# Patient Record
Sex: FEMALE | Race: WHITE | Employment: OTHER | URBAN - METROPOLITAN AREA
[De-identification: names, ages, dates, MRNs, and addresses within clinical notes are randomized per-mention and may not be internally consistent; named-entity substitution may affect disease eponyms.]

---

## 2017-04-29 DIAGNOSIS — F51.02 TRANSIENT INSOMNIA: ICD-10-CM

## 2017-04-30 RX ORDER — ZOLPIDEM TARTRATE 5 MG/1
TABLET ORAL
Qty: 25 TAB | Refills: 2 | OUTPATIENT
Start: 2017-04-30 | End: 2017-10-04 | Stop reason: SDUPTHER

## 2017-05-11 ENCOUNTER — TELEPHONE (OUTPATIENT)
Dept: INTERNAL MEDICINE CLINIC | Age: 77
End: 2017-05-11

## 2017-05-15 ENCOUNTER — TELEPHONE (OUTPATIENT)
Dept: INTERNAL MEDICINE CLINIC | Age: 77
End: 2017-05-15

## 2017-05-15 NOTE — TELEPHONE ENCOUNTER
Patient advised she received a letter stating she was overdue for her pneumonia vaccine but advises she received the prevnar in 2015 and per her record she received the pneumovax in 2010. I advised we will send ALESSIO a records request to get her eye exam report. I also advised she is up to date on her shingles & tdap as well. I advised at her annual MW all of this can be addressed. Patient advised she will continue to call periodically to see if we have any openings sooner than October for her annual visit.

## 2017-05-15 NOTE — TELEPHONE ENCOUNTER
Pt requesting a return call from the nurse to discuss inoculations that are due before her next scheduled visit.  Please call [phone]

## 2017-05-23 DIAGNOSIS — E89.41 HOT FLASHES DUE TO SURGICAL MENOPAUSE: ICD-10-CM

## 2017-05-24 RX ORDER — NORETHINDRONE ACETATE AND ETHINYL ESTRADIOL 1; 5 MG/1; UG/1
TABLET ORAL
Qty: 84 TAB | Refills: 1 | Status: SHIPPED | OUTPATIENT
Start: 2017-05-24 | End: 2018-05-30 | Stop reason: SDUPTHER

## 2017-09-11 DIAGNOSIS — E03.9 UNSPECIFIED HYPOTHYROIDISM: ICD-10-CM

## 2017-09-11 RX ORDER — LEVOTHYROXINE SODIUM 75 UG/1
TABLET ORAL
Qty: 90 TAB | Refills: 3 | Status: SHIPPED | OUTPATIENT
Start: 2017-09-11 | End: 2018-10-15 | Stop reason: SDUPTHER

## 2017-09-18 ENCOUNTER — TELEPHONE (OUTPATIENT)
Dept: INTERNAL MEDICINE CLINIC | Age: 77
End: 2017-09-18

## 2017-09-18 DIAGNOSIS — E03.9 UNSPECIFIED HYPOTHYROIDISM: Primary | ICD-10-CM

## 2017-09-18 DIAGNOSIS — E55.9 VITAMIN D DEFICIENCY: ICD-10-CM

## 2017-09-18 DIAGNOSIS — Z00.00 ROUTINE GENERAL MEDICAL EXAMINATION AT A HEALTH CARE FACILITY: ICD-10-CM

## 2017-09-18 NOTE — TELEPHONE ENCOUNTER
Patient has appointment for her Annual Medicare Wellness on 10/04/2017 and she wants to come in before then to get blood work done.  Call her at [phone]

## 2017-09-18 NOTE — TELEPHONE ENCOUNTER
Notified patient her labs have been printed. She asked I fax them over to the lab [facility] on [facility] & she will get them done fasting a week prior to her physical appt. Lab slip has been faxed.

## 2017-09-27 ENCOUNTER — HOSPITAL ENCOUNTER (OUTPATIENT)
Dept: LAB | Age: 77
Discharge: HOME | End: 2017-09-27
Payer: MEDICARE

## 2017-09-27 PROCEDURE — 82306 VITAMIN D 25 HYDROXY: CPT

## 2017-09-27 PROCEDURE — 81001 URINALYSIS AUTO W/SCOPE: CPT

## 2017-09-27 PROCEDURE — 80061 LIPID PANEL: CPT

## 2017-09-27 PROCEDURE — 84443 ASSAY THYROID STIM HORMONE: CPT

## 2017-09-27 PROCEDURE — 84439 ASSAY OF FREE THYROXINE: CPT

## 2017-09-27 PROCEDURE — 85025 COMPLETE CBC W/AUTO DIFF WBC: CPT

## 2017-09-27 PROCEDURE — 36415 COLL VENOUS BLD VENIPUNCTURE: CPT

## 2017-09-27 PROCEDURE — 80053 COMPREHEN METABOLIC PANEL: CPT

## 2017-09-28 LAB
25(OH)D3+25(OH)D2 SERPL-MCNC: 43.8 NG/ML (ref 30–100)
ALBUMIN SERPL-MCNC: 3.9 G/DL (ref 3.5–4.8)
ALBUMIN/GLOB SERPL: 1.6 {RATIO} (ref 1.2–2.2)
ALP SERPL-CCNC: 50 IU/L (ref 39–117)
ALT SERPL-CCNC: 15 IU/L (ref 0–32)
APPEARANCE UR: CLEAR
AST SERPL-CCNC: 23 IU/L (ref 0–40)
BACTERIA #/AREA URNS HPF: ABNORMAL /[HPF]
BASOPHILS # BLD AUTO: 0.1 X10E3/UL (ref 0–0.2)
BASOPHILS NFR BLD AUTO: 1 %
BILIRUB SERPL-MCNC: 0.5 MG/DL (ref 0–1.2)
BILIRUB UR QL STRIP: NEGATIVE
BUN SERPL-MCNC: 17 MG/DL (ref 8–27)
BUN/CREAT SERPL: 20 (ref 12–28)
CALCIUM SERPL-MCNC: 8.6 MG/DL (ref 8.7–10.3)
CASTS URNS QL MICRO: ABNORMAL /LPF
CHLORIDE SERPL-SCNC: 105 MMOL/L (ref 96–106)
CHOLEST SERPL-MCNC: 183 MG/DL (ref 100–199)
CO2 SERPL-SCNC: 24 MMOL/L (ref 18–29)
COLOR UR: YELLOW
CREAT SERPL-MCNC: 0.84 MG/DL (ref 0.57–1)
EOSINOPHIL # BLD AUTO: 0.2 X10E3/UL (ref 0–0.4)
EOSINOPHIL NFR BLD AUTO: 3 %
EPI CELLS #/AREA URNS HPF: ABNORMAL /HPF
ERYTHROCYTE [DISTWIDTH] IN BLOOD BY AUTOMATED COUNT: 14 % (ref 12.3–15.4)
GLOBULIN SER CALC-MCNC: 2.5 G/DL (ref 1.5–4.5)
GLUCOSE SERPL-MCNC: 80 MG/DL (ref 65–99)
GLUCOSE UR QL: NEGATIVE
HCT VFR BLD AUTO: 38.9 % (ref 34–46.6)
HDLC SERPL-MCNC: 62 MG/DL
HGB BLD-MCNC: 13.3 G/DL (ref 11.1–15.9)
HGB UR QL STRIP: NEGATIVE
IMM GRANULOCYTES # BLD: 0 X10E3/UL (ref 0–0.1)
IMM GRANULOCYTES NFR BLD: 0 %
INTERPRETATION, 910389: NORMAL
KETONES UR QL STRIP: NEGATIVE
LDLC SERPL CALC-MCNC: 109 MG/DL (ref 0–99)
LEUKOCYTE ESTERASE UR QL STRIP: ABNORMAL
LYMPHOCYTES # BLD AUTO: 2.3 X10E3/UL (ref 0.7–3.1)
LYMPHOCYTES NFR BLD AUTO: 34 %
MCH RBC QN AUTO: 30.4 PG (ref 26.6–33)
MCHC RBC AUTO-ENTMCNC: 34.2 G/DL (ref 31.5–35.7)
MCV RBC AUTO: 89 FL (ref 79–97)
MICRO URNS: ABNORMAL
MONOCYTES # BLD AUTO: 0.4 X10E3/UL (ref 0.1–0.9)
MONOCYTES NFR BLD AUTO: 7 %
NEUTROPHILS # BLD AUTO: 3.8 X10E3/UL (ref 1.4–7)
NEUTROPHILS NFR BLD AUTO: 55 %
NITRITE UR QL STRIP: NEGATIVE
PH UR STRIP: 7 [PH] (ref 5–7.5)
PLATELET # BLD AUTO: 268 X10E3/UL (ref 150–379)
POTASSIUM SERPL-SCNC: 4.3 MMOL/L (ref 3.5–5.2)
PROT SERPL-MCNC: 6.4 G/DL (ref 6–8.5)
PROT UR QL STRIP: NEGATIVE
RBC # BLD AUTO: 4.37 X10E6/UL (ref 3.77–5.28)
RBC #/AREA URNS HPF: ABNORMAL /HPF
SODIUM SERPL-SCNC: 142 MMOL/L (ref 134–144)
SP GR UR: 1.01 (ref 1–1.03)
T4 FREE SERPL-MCNC: 1.49 NG/DL (ref 0.82–1.77)
TRIGL SERPL-MCNC: 60 MG/DL (ref 0–149)
TSH SERPL DL<=0.005 MIU/L-ACNC: 0.94 UIU/ML (ref 0.45–4.5)
UROBILINOGEN UR STRIP-MCNC: 0.2 MG/DL (ref 0.2–1)
VLDLC SERPL CALC-MCNC: 12 MG/DL (ref 5–40)
WBC # BLD AUTO: 6.8 X10E3/UL (ref 3.4–10.8)
WBC #/AREA URNS HPF: ABNORMAL /HPF

## 2017-10-04 ENCOUNTER — HOSPITAL ENCOUNTER (OUTPATIENT)
Dept: LAB | Age: 77
Discharge: HOME | End: 2017-10-04
Payer: MEDICARE

## 2017-10-04 ENCOUNTER — OFFICE VISIT (OUTPATIENT)
Dept: INTERNAL MEDICINE CLINIC | Age: 77
End: 2017-10-04

## 2017-10-04 VITALS
SYSTOLIC BLOOD PRESSURE: 111 MMHG | WEIGHT: 106.6 LBS | TEMPERATURE: 97.8 F | BODY MASS INDEX: 20.12 KG/M2 | DIASTOLIC BLOOD PRESSURE: 60 MMHG | RESPIRATION RATE: 16 BRPM | HEIGHT: 61 IN | OXYGEN SATURATION: 96 % | HEART RATE: 56 BPM

## 2017-10-04 DIAGNOSIS — Z00.00 MEDICARE ANNUAL WELLNESS VISIT, INITIAL: Primary | ICD-10-CM

## 2017-10-04 DIAGNOSIS — Z71.89 ADVANCED CARE PLANNING/COUNSELING DISCUSSION: ICD-10-CM

## 2017-10-04 DIAGNOSIS — F51.02 TRANSIENT INSOMNIA: ICD-10-CM

## 2017-10-04 DIAGNOSIS — E03.9 ACQUIRED HYPOTHYROIDISM: ICD-10-CM

## 2017-10-04 DIAGNOSIS — R31.29 MICROSCOPIC HEMATURIA: ICD-10-CM

## 2017-10-04 DIAGNOSIS — Z13.31 SCREENING FOR DEPRESSION: ICD-10-CM

## 2017-10-04 PROCEDURE — 81001 URINALYSIS AUTO W/SCOPE: CPT

## 2017-10-04 RX ORDER — ZOLPIDEM TARTRATE 5 MG/1
TABLET ORAL
Qty: 25 TAB | Refills: 2 | OUTPATIENT
Start: 2017-10-04 | End: 2018-08-02 | Stop reason: SDUPTHER

## 2017-10-04 NOTE — PROGRESS NOTES
Nurse Navigator Medicare Wellness Visit performed by IAN Canales    This is an Initial Gissel Exam (AWV) (Performed 12 months after IPPE or effective date of Medicare Part B enrollment, Once in a lifetime)    I have reviewed the patient's medical history in detail and updated the computerized patient record. 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    Skin cancer     Squamous cell carcinoma of lower leg 12/18/12    treated with Mohs surgery    Sun-damaged skin     Sunburn, blistering       Past Surgical History:   Procedure Laterality Date    AMB POC MOHS 1 STAGE T/A/L  12/18/12    squamous cell carcinoma-right lower lateral leg    BREAST SURGERY PROCEDURE UNLISTED      breast cyst    ENDOSCOPY, COLON, DIAGNOSTIC  2/2/10    1/10 Dr Josie Luna 7 year follow up    HX GYN      d and c    HX HEENT  4/13    right leg basal cell resected    HX HYSTERECTOMY  5/26/2015    Yenifer Chill with cystocele repair and being peritoneal cyst repair    HX ORTHOPAEDIC      bunion repair bilaterally    HX ORTHOPAEDIC      left hammartoe repair    HX ORTHOPAEDIC  9/13    left shoulder surgery dr leigh Mcgovern ORTHOPAEDIC  6/17/15    left 4th toe surgery    HX TONSILLECTOMY       Current Outpatient Prescriptions   Medication Sig Dispense Refill    LEVOXYL 75 mcg tablet TAKE 1 TABLET DAILY BEFORE BREAKFAST 90 Tab 3    JINTELI 1-5 mg-mcg tab TAKE 1 TABLET DAILY 84 Tab 1    multivitamin (ONE A DAY) tablet Take 1 Tab by mouth daily.  aspirin 81 mg tablet Take 81 mg by mouth.  ERGOCALCIFEROL, VITAMIN D2, (VITAMIN D PO) Take 1,000 mg by mouth daily.  JXRTPHWX-IXMCVEMPLS-RW GLYCN-C PO take  by mouth.  CALTRATE 600 PO take 600 mg by mouth daily.       zolpidem (AMBIEN) 5 mg tablet take 1 tablet by mouth NIGHTLY if needed for sleep 25 Tab 2     No Known Allergies  Family History   Problem Relation Age of Onset    Heart Failure Mother     Thyroid Disease Mother     Cancer Mother      uterine ca, thyroid ca    Cancer Father      pancreatic ca age 43     Social History   Substance Use Topics    Smoking status: Never Smoker    Smokeless tobacco: Never Used    Alcohol use 2.0 oz/week     4 Glasses of wine per week      Comment: socially     Patient Active Problem List   Diagnosis Code    Fibrocystic breast N60.19    Keratitis sicca, bilateral H16.223    Cancer of skin, squamous cell C44.92    Unspecified hypothyroidism E03.9    Osteoporosis M81.0    Advanced care planning/counseling discussion Z71.89       Depression Risk Factor Screening:   Patient denies feelings of being down, depressed or hopeless at this time. Patient states that they have a strong support system within their family & friends. PHQ over the last two weeks 10/4/2017   Little interest or pleasure in doing things Not at all   Feeling down, depressed or hopeless Not at all   Total Score PHQ 2 0     Alcohol Risk Factor Screening: You do not drink alcohol or very rarely. Functional Ability and Level of Safety:     Hearing Loss  Hearing is good. Activities of Daily Living  The home contains: no safety equipment. Patient does total self care   Patient states that she lives with her  in a private residence. Patient states independence in all ADLs & denies the use of assistive devices for ambulation. NN encouraged patient to continue and/ or introduce routine physical exercise into their daily routine as applicable & as recommended by PCP. Patient verbalized understanding & agreement to take this into consideration. Patient shares that she is very active physically, playing tennis 3 times a week. Patient adds that she & her  continue to be ski enthusiasts (skiing the [place] at [place] with their children & grandchildren).    ADL Assessment 10/4/2017   Feeding yourself No Help Needed   Getting from bed to chair No Help Needed   Getting dressed No Help Needed   Bathing or showering No Help Needed   Walk across the room (includes cane/walker) No Help Needed   Using the telphone No Help Needed   Taking your medications No Help Needed   Preparing meals No Help Needed   Managing money (expenses/bills) No Help Needed   Moderately strenuous housework (laundry) No Help Needed   Shopping for personal items (toiletries/medicines) No Help Needed   Shopping for groceries No Help Needed   Driving No Help Needed   Climbing a flight of stairs No Help Needed   Getting to places beyond walking distances No Help Needed     Patient denies falls within the past year & verbalizes awareness of fall prevention strategies. Fall RiskFall Risk Assessment, last 12 mths 10/4/2017   Able to walk? Yes   Fall in past 12 months? No       Abuse Screen  Patient is not abused   Abuse Screening Questionnaire 10/4/2017   Do you ever feel afraid of your partner? N   Are you in a relationship with someone who physically or mentally threatens you? N   Is it safe for you to go home? Y       Cognitive Screening   Evaluation of Cognitive Function:  Has your family/caregiver stated any concerns about your memory: no      Patient Care Team   Patient Care Team:  Gloria Sinclair MD as PCP - General    Assessment/Plan   Education and counseling provided:  Are appropriate based on today's review and evaluation  End-of-Life planning (with patient's consent)  Pneumococcal Vaccine  Influenza Vaccine  Screening Mammography  Colorectal cancer screening tests  Bone mass measurement (DEXA)  Screening for glaucoma  tdap & shingles vaccinations    Diagnoses and all orders for this visit:    1. Microscopic hematuria  -     URINALYSIS W/ RFLX MICROSCOPIC    2. Advanced care planning/counseling discussion    AWV Summary:    1. Patient states that a completed Advanced Medical Directive is at home.  NN encouraged patient to bring a copy of the completed Advanced Medical Directive to the office for scanning into the medical record. Patient verbalized understanding & agreement. 2. Patient is up to date on the following immunizations:  Immunization History   Administered Date(s) Administered    Hepatitis A Vaccine 05/01/2008, 11/01/2008    Influenza High Dose Vaccine PF 10/05/2015, 12/21/2016    Pneumococcal Conjugate (PCV-13) 10/05/2015    TD Vaccine 10/06/2004    Tdap 07/02/2014    ZZZ-RETIRED (DO NOT USE) Pneumococcal Vaccine (Unspecified Type) 03/17/2010    Zoster 03/01/2007   Patient confirmed the aforementioned preventative immunization dates are correct. Patient states that she will be getting a 2017 flu vaccine within the next month & she will notify PCP's office once received. Patient's health maintenance immunization record has been updated & is current. 3. Patient states that she follows the PCP's recommendations for the following screenings: Mammography (report on file from 5/2017), pap/ pelvic, DEXA scan (report on file from 8/2016) & colonoscopy (report on file from 2/2/2010 performed by Dr. Lilian Harrell of [facility] with recommended follow-up screening in 10 years, 2020). Patient confirmed the aforementioned preventative screening dates. 4. Patient was not wearing corrective lenses. Patient reports having a routine eye exam & glaucoma screening within the last year performed by Dr. Lakisha Simpson at the [facility]. DAMIAN faxed requesting a copy of patient's last eye exam with glaucoma screening with patient's verbal approval.     Patient verbalized understanding of all information discussed. Patient was given the opportunity to ask questions. Medication reconciliation completed by MA/ LPN and reviewed by PCP. Patient provided AVS, either a printed version or electronic version in "[employer]", which includes Medicare Wellness Preventative Screening Table.        Health Maintenance Due   Topic Date Due    INFLUENZA AGE 9 TO ADULT  08/01/2017

## 2017-10-04 NOTE — PATIENT INSTRUCTIONS
Medicare Part B Preventive Services Guidelines/Limitations Date last completed and Frequency Due Date   Bone Mass Measurement  (age 72 & older, biennial) Requires diagnosis related to osteoporosis or estrogen deficiency. Biennial benefit unless patient has history of long-term glucocorticoid tx or baseline is needed because initial test was by other method Completed 8/2016    Recommended every 2 years As recommended by your PCP or Specialist     Cardiovascular Screening Blood Tests (every 5 years)  Total cholesterol, HDL, Triglycerides Order as a panel if possible Completed 9/2017    As recommended by your PCP As recommended by your PCP or Specialist   Colorectal Cancer Screening  -Fecal occult blood test (annual)  -Flexible sigmoidoscopy (5y)  -Screening colonoscopy (10y)  -Barium Enema Age 49-80; After age [de-identified] if history of abnormal results Completed 2/2/2010     Recommended follow-up in 10 years  As recommended by your PCP or Specialist     Counseling to Prevent Tobacco Use (up to 8 sessions per year)  - Counseling greater than 3 and up to 10 minutes  - Counseling greater than 10 minutes Patients must be asymptomatic of tobacco-related conditions to receive as preventive service N/A N/A   Diabetes Screening Tests (at least every 3 years, Medicare covers annually or at 6-month intervals for prediabetic patients)    Fasting blood sugar (FBS) or glucose tolerance test (GTT) Patient must be diagnosed with one of the following:  -Hypertension, Dyslipidemia, obesity, previous impaired FBS or GTT  Or any two of the following: overweight, FH of diabetes, age ? 72, history of gestational diabetes, birth of baby weighing more than 9 pounds Completed 9/2017    Recommended every 3 years for non-diabetics     As recommended by your PCP or Specialist     Glaucoma Screening (no USPSTF recommendation) Diabetes mellitus, family history, , age 48 or over,  American, age 72 or over Completed 11/2016    Recommended annually As recommended by your PCP or Specialist   Seasonal Influenza Vaccination (annually)  Completed 12/2016    Recommended Annually Due Fall 2017   TDAP Vaccination  Completed 7/2014    Recommended every 10 years As recommended by your PCP or Specialist   Zoster (Shingles) Vaccination Covered by Medicare Part D through the pharmacy- PCP provides prescription Completed 3/2007    Recommended once over age 48  Complete   Pneumococcal Vaccination (once after 72)  Pneumo 23- 3/2010  Recommended once over the age of 72    Prevnar 15- 10/2015 Recommended once over the age of 72 Complete        Complete   Screening Mammography (biennial age 54-69) Annually (age 36 or over) Completed 5/2017   As recommended by your PCP or Specialist     Screening Pap Tests and Pelvic Examination (up to age 79 and after 79 if unknown history or abnormal study last 8 years) Every 25 months except high risk As recommended by your PCP or Specialist   As recommended by your PCP or Specialist     Ultrasound Screening for Abdominal Aortic Aneurysm (AAA) (once) Patient must be referred through IPPE and not have had a screening for abdominal aortic aneurysm before under Medicare. Limited to patients who meet one of the following criteria:  - Men who are 73-68 years old and have smoked more than 100 cigarettes in their lifetime.  -Anyone with a FH of AAA  -Anyone recommended for screening by USPSTF Not indicated unless recommended by PCP   Not indicated unless recommended by PCP     Family Practice Management 2011    Please bring a copy of your completed advance medical directive to the office so it may be added to your medical record. Thank you. If you have any questions or concerns please feel free to contact me at [phone]. It was a pleasure meeting you today and participating in your healthcare.   Adeline Kauffman RN      Medicare Wellness Visit, Female    The best way to live healthy is to have a healthy lifestyle by eating a well-balanced diet, exercising regularly, limiting alcohol and stopping smoking. Regular physical exams and screening tests are another way to keep healthy. Preventive exams provided by your health care provider can find health problems before they become diseases or illnesses. Preventive services including immunizations, screening tests, monitoring and exams can help you take care of your own health. All people over age 72 should have a pneumovax  and and a prevnar shot to prevent pneumonia. These are once in a lifetime unless you and your provider decide differently. All people over 65 should have a yearly flu shot and a tetanus vaccine every 10 years. A bone mass density to screen for osteoporosis or thinning of the bones should be done every 2 years after 65. Screening for diabetes mellitus with a blood sugar test should be done every year. Glaucoma is a disease of the eye due to increased ocular pressure that can lead to blindness and it should be done every year by an eye professional.    Cardiovascular screening tests that check for elevated lipids (fatty part of blood) which can lead to heart disease and strokes should be done every 5 years. Colorectal screening that evaluates for blood or polyps in your colon should be done yearly as a stool test or every five years as a flexible sigmoidoscope or every 10 years as a colonoscopy up to age 76. Breast cancer screening with a mammogram is recommended biennially  for women age 54-69. Screening for cervical cancer with a pap smear and pelvic exam is recommended for women after age 72 years every 2 years up to age 79 or when the provider and patient decide to stop. If there is a history of cervical abnormalities or other increased risk for cancer then the test is recommended yearly. Hepatitis C screening is also recommended for anyone born between 80 through [address].     A shingles vaccine is also recommended once in a lifetime after age 61. Your Medicare Wellness Exam is recommended annually.     Here is a list of your current Health Maintenance items with a due date:  Health Maintenance Due   Topic Date Due    Flu Vaccine  08/01/2017

## 2017-10-04 NOTE — MR AVS SNAPSHOT
Visit Information Date & Time Provider Department Dept. Phone Encounter #  
 10/4/2017  2:30 PM Belinda Kurtz MD [facility] [address] [id number] Upcoming Health Maintenance Date Due INFLUENZA AGE 9 TO ADULT 8/1/2017 MEDICARE YEARLY EXAM 8/5/2017 GLAUCOMA SCREENING Q2Y 11/8/2018 COLONOSCOPY 2/2/2020 DTaP/Tdap/Td series (2 - Td) 7/2/2024 Allergies as of 10/4/2017  Review Complete On: 10/4/2017 By: Jocelyn Delvalle LPN No Known Allergies Current Immunizations  Reviewed on 10/4/2017 Name Date Hepatitis A Vaccine 11/1/2008, 5/1/2008 Influenza High Dose Vaccine PF 12/21/2016, 10/5/2015 Pneumococcal Conjugate (PCV-13) 10/5/2015 TD Vaccine 10/6/2004 Tdap 7/2/2014 ZZZ-RETIRED (DO NOT USE) Pneumococcal Vaccine (Unspecified Type) 3/17/2010 11:18 AM  
 Zoster 3/1/2007 Reviewed by Belinda Kurtz MD on 10/4/2017 at  2:33 PM  
You Were Diagnosed With   
  
 Codes Comments Microscopic hematuria    -  Primary ICD-10-CM: R31.29 ICD-9-CM: 599.72 Vitals BP Pulse Temp Resp Height(growth percentile) Weight(growth percentile) 111/60 (BP 1 Location: Left arm, BP Patient Position: Sitting) (!) 56 97.8 °F (36.6 °C) (Oral) 16 5' 1\" (1.549 m) 106 lb 9.6 oz (48.4 kg) SpO2 BMI OB Status Smoking Status 96% 20.14 kg/m2 Postmenopausal Never Smoker Vitals History BMI and BSA Data Body Mass Index Body Surface Area  
 20.14 kg/m 2 1.44 m 2 Preferred Pharmacy Pharmacy Name Phone  [facility] [phone] Your Updated Medication List  
  
   
This list is accurate as of: 10/4/17  2:49 PM.  Always use your most recent med list.  
  
  
  
  
 aspirin 81 mg tablet Take 81 mg by mouth. CALTRATE 600 PO  
take 600 mg by mouth daily. OYVTMBOM-UXDADCQCEH-EA GLYCN-C PO  
take  by mouth. JINTELI 1-5 mg-mcg Tab Generic drug:  norethindrone acetate-ethinyl estradiol TAKE 1 TABLET DAILY LEVOXYL 75 mcg tablet Generic drug:  levothyroxine TAKE 1 TABLET DAILY BEFORE BREAKFAST  
  
 multivitamin tablet Commonly known as:  ONE A DAY Take 1 Tab by mouth daily. VITAMIN D2 PO Take 1,000 mg by mouth daily. zolpidem 5 mg tablet Commonly known as:  AMBIEN  
take 1 tablet by mouth NIGHTLY if needed for sleep We Performed the Following URINALYSIS W/ RFLX MICROSCOPIC [32219 CPT(R)] Introducing [facility] SERVICES! Dear Burgess Ireland: 
Thank you for requesting a [employer] account. Our records indicate that you already have an active [employer] account. You can access your account anytime at [email or web address]. [email or web address] Did you know that you can access your hospital and ER discharge instructions at any time in [employer]? You can also review all of your test results from your hospital stay or ER visit. Additional Information If you have questions, please visit the Frequently Asked Questions section of the [employer] website at [email or web address] Remember, [employer] is NOT to be used for urgent needs. For medical emergencies, dial 911. Now available from your iPhone and Android! Please provide this summary of care documentation to your next provider. Your primary care clinician is listed as Familia Handley. If you have any questions after today's visit, please call [phone].

## 2017-10-05 LAB
APPEARANCE UR: CLEAR
BILIRUB UR QL STRIP: NEGATIVE
COLOR UR: YELLOW
GLUCOSE UR QL: NEGATIVE
HGB UR QL STRIP: NEGATIVE
KETONES UR QL STRIP: NEGATIVE
LEUKOCYTE ESTERASE UR QL STRIP: NEGATIVE
MICRO URNS: NORMAL
NITRITE UR QL STRIP: NEGATIVE
PH UR STRIP: 6.5 [PH] (ref 5–7.5)
PROT UR QL STRIP: NEGATIVE
SP GR UR: 1.01 (ref 1–1.03)
UROBILINOGEN UR STRIP-MCNC: 0.2 MG/DL (ref 0.2–1)

## 2017-10-05 NOTE — PROGRESS NOTES
HISTORY OF PRESENT ILLNESS  Jason Mobley is a 78 y.o. female. HPI  Patient is seen for followup of hypothyroidism. Thyroid ROS: denies fatigue, weight changes, heat/cold intolerance, bowel/skin changes or CVS symptoms. She is on jintelli 3 days a week and on 2 days had hot flashes  Up to date on gyn care and mammo  Colonoscopy 2010  Golfs, skies and takes yoga classes  Stress over dt with fibro  No new sxs overall doing well  Review of Systems   Constitutional: Negative for chills, fever and weight loss. Respiratory: Negative for cough, shortness of breath and wheezing. Cardiovascular: Negative for chest pain, palpitations, orthopnea, leg swelling and PND. Gastrointestinal: Negative for abdominal pain, diarrhea, heartburn and nausea. Genitourinary: Negative for dysuria and urgency. Musculoskeletal: Negative for myalgias. Neurological: Negative for dizziness and headaches. Psychiatric/Behavioral: Negative for depression. The patient has insomnia. The patient is not nervous/anxious. All other systems reviewed and are negative. Physical Exam   Constitutional: She is oriented to person, place, and time. She appears well-developed and well-nourished. HENT:   Head: Normocephalic and atraumatic. Right Ear: Tympanic membrane, external ear and ear canal normal.   Left Ear: Tympanic membrane, external ear and ear canal normal.   Nose: Nose normal.   Mouth/Throat: Oropharynx is clear and moist and mucous membranes are normal. No oropharyngeal exudate. Eyes: Conjunctivae are normal. Pupils are equal, round, and reactive to light. Right eye exhibits no discharge. Left eye exhibits no discharge. Neck: Normal range of motion. Neck supple. Carotid bruit is not present. No thyromegaly present. Cardiovascular: Normal rate, regular rhythm, S1 normal, S2 normal, normal heart sounds and intact distal pulses. No murmur heard.   Pulmonary/Chest: Effort normal and breath sounds normal. No respiratory distress. She has no wheezes. She has no rales. Breast exam bilaterally without masses axillary nodes or discharge. BSE reviewed      Abdominal: Soft. Bowel sounds are normal. She exhibits no distension and no mass. There is no tenderness. Musculoskeletal: She exhibits no edema. Lymphadenopathy:     She has no cervical adenopathy. Neurological: She is alert and oriented to person, place, and time. Skin: Skin is warm and dry. No rash noted. Psychiatric: She has a normal mood and affect. Her behavior is normal.   Nursing note and vitals reviewed. ASSESSMENT and PLAN  Diagnoses and all orders for this visit:    1. Medicare annual wellness visit, initial  I personally saw and examined the patient. I have reviewed and agree with the Nurse navigatorer's findings including all diagnostic interpretations and plans as written. I was present during the key parts of separately billed procedures. 2. Microscopic hematuria-repeat ua  -     URINALYSIS W/ RFLX MICROSCOPIC    3. Advanced care planning/counseling discussion    4.  Screening for depression  4-ggepaosopby-hpll dose and recent tsh good

## 2017-10-12 ENCOUNTER — TELEPHONE (OUTPATIENT)
Dept: INTERNAL MEDICINE CLINIC | Age: 77
End: 2017-10-12

## 2017-10-12 NOTE — TELEPHONE ENCOUNTER
Patient expressed she did her repeat UA & was advised everything was normal but yesterday she began having pain in her lower abdomen & [facility]. She requested our fax number so she could fax the report & the results of her all her tests done & would like PCP to review & reach back out to her if she has any concerns regarding her results. Provided patient the nurse fax line to ensure I get the report.

## 2017-10-12 NOTE — TELEPHONE ENCOUNTER
Patient wants to speak with nurse today regarding her urine test she had done her no is [phone]

## 2017-10-18 ENCOUNTER — OFFICE VISIT (OUTPATIENT)
Dept: INTERNAL MEDICINE CLINIC | Age: 77
End: 2017-10-18

## 2017-10-18 ENCOUNTER — HOSPITAL ENCOUNTER (OUTPATIENT)
Dept: LAB | Age: 77
Discharge: HOME | End: 2017-10-18
Payer: MEDICARE

## 2017-10-18 VITALS
SYSTOLIC BLOOD PRESSURE: 129 MMHG | BODY MASS INDEX: 20.2 KG/M2 | HEIGHT: 61 IN | TEMPERATURE: 98.1 F | RESPIRATION RATE: 16 BRPM | DIASTOLIC BLOOD PRESSURE: 74 MMHG | OXYGEN SATURATION: 98 % | WEIGHT: 107 LBS | HEART RATE: 56 BPM

## 2017-10-18 DIAGNOSIS — N39.0 URINARY TRACT INFECTION WITHOUT HEMATURIA, SITE UNSPECIFIED: Primary | ICD-10-CM

## 2017-10-18 PROCEDURE — 81001 URINALYSIS AUTO W/SCOPE: CPT

## 2017-10-18 PROCEDURE — 87086 URINE CULTURE/COLONY COUNT: CPT

## 2017-10-18 NOTE — PROGRESS NOTES
HISTORY OF PRESENT ILLNESS  Chanel Sandoval is a 78 y.o. female. DAVEY Chandra went to [facility] around July 4th, was treated for a UTI and also given Diflucan. Felt well until October 11th. She had spent much of the day at a meeting and had not been able to urinate. She began to have lower abdominal cramping and dysuria. Went again to [facility], where UA showed TNTC white cells, 6-10 red cells, many epithelialis and 1+ bacteria. She was treated with Macrobid 100 b.i.d. for seven days and Pyridium 200 t.i.d. She has finished the medicine yesterday. She feels better, although still wonders that perhaps she is a little uncomfortable in vaginal area, no itch, no discharge, no fevers or chills. Review of Systems   Constitutional: Negative for fever. Gastrointestinal: Negative for abdominal pain and vomiting. Genitourinary: Negative for dysuria, frequency and urgency. Skin: Negative for itching. Physical Exam   Constitutional: She is oriented to person, place, and time. She appears well-developed and well-nourished. HENT:   Head: Normocephalic and atraumatic. Abdominal: Soft. Bowel sounds are normal. There is no tenderness. Genitourinary: Vagina normal.   Neurological: She is alert and oriented to person, place, and time. Psychiatric: She has a normal mood and affect. Her behavior is normal. Judgment and thought content normal.   Nursing note and vitals reviewed. ASSESSMENT and PLAN  Diagnoses and all orders for this visit:    1.  Urinary tract infection without hematuria, site unspecified  -     URINALYSIS W/ RFLX MICROSCOPIC  -     CULTURE, URINE    Treated on 10/11  Check cx to be sure all clear  Discussed patterns to watch for to suggest triggers for uti-contact if recurrent sxs

## 2017-10-19 LAB
APPEARANCE UR: CLEAR
BACTERIA #/AREA URNS HPF: NORMAL /[HPF]
BACTERIA UR CULT: NORMAL
BILIRUB UR QL STRIP: NEGATIVE
CASTS URNS QL MICRO: NORMAL /LPF
COLOR UR: YELLOW
EPI CELLS #/AREA URNS HPF: NORMAL /HPF
GLUCOSE UR QL: NEGATIVE
HGB UR QL STRIP: NEGATIVE
KETONES UR QL STRIP: NEGATIVE
LEUKOCYTE ESTERASE UR QL STRIP: ABNORMAL
MICRO URNS: ABNORMAL
MUCOUS THREADS URNS QL MICRO: PRESENT
NITRITE UR QL STRIP: NEGATIVE
PH UR STRIP: 7 [PH] (ref 5–7.5)
PROT UR QL STRIP: NEGATIVE
RBC #/AREA URNS HPF: NORMAL /HPF
SP GR UR: 1.01 (ref 1–1.03)
UROBILINOGEN UR STRIP-MCNC: 0.2 MG/DL (ref 0.2–1)
WBC #/AREA URNS HPF: NORMAL /HPF

## 2017-10-27 ENCOUNTER — TELEPHONE (OUTPATIENT)
Dept: INTERNAL MEDICINE CLINIC | Age: 77
End: 2017-10-27

## 2017-10-27 RX ORDER — SULFAMETHOXAZOLE AND TRIMETHOPRIM 800; 160 MG/1; MG/1
1 TABLET ORAL 2 TIMES DAILY
Qty: 10 TAB | Refills: 0 | Status: SHIPPED | OUTPATIENT
Start: 2017-10-27 | End: 2018-10-15 | Stop reason: ALTCHOICE

## 2017-10-27 NOTE — TELEPHONE ENCOUNTER
Pt calling to complain of the exact same pain she had when starting with urinary issue last time. Unsure if she should go to [facility] or if medication can be sent in. Pt does not want to wait until Monday. I advised that last culture was normal. Pt advised she had been on medication prior to that and is concerned with reoccurring problem.

## 2017-12-12 DIAGNOSIS — E03.9 HYPOTHYROIDISM: ICD-10-CM

## 2017-12-12 RX ORDER — LEVOTHYROXINE SODIUM 75 UG/1
TABLET ORAL
Qty: 90 TAB | Refills: 3 | Status: SHIPPED | OUTPATIENT
Start: 2017-12-12 | End: 2018-10-15 | Stop reason: SDUPTHER

## 2018-03-12 ENCOUNTER — TELEPHONE (OUTPATIENT)
Dept: INTERNAL MEDICINE CLINIC | Age: 78
End: 2018-03-12

## 2018-03-12 NOTE — TELEPHONE ENCOUNTER
Notified patient to give us a reminder call about a month prior to her physical appt & we will mail the lab slips out to her to do fasting prior to her appt. Patient voiced understanding.

## 2018-03-12 NOTE — TELEPHONE ENCOUNTER
Patient wants a lab order for her fasting work. She has appointment for 10/15/2018 for Her Medicare Wellness. And she wants Dr Diane Prater to have the results.   Her no is [phone]

## 2018-05-30 DIAGNOSIS — E89.41 HOT FLASHES DUE TO SURGICAL MENOPAUSE: ICD-10-CM

## 2018-05-30 RX ORDER — NORETHINDRONE ACETATE AND ETHINYL ESTRADIOL .005; 1 MG/1; MG/1
TABLET, FILM COATED ORAL
Qty: 84 TAB | Refills: 1 | Status: SHIPPED | OUTPATIENT
Start: 2018-05-30 | End: 2018-10-15 | Stop reason: SDUPTHER

## 2018-08-02 DIAGNOSIS — F51.02 TRANSIENT INSOMNIA: ICD-10-CM

## 2018-08-03 RX ORDER — ZOLPIDEM TARTRATE 5 MG/1
TABLET ORAL
Qty: 25 TAB | Refills: 0 | OUTPATIENT
Start: 2018-08-03 | End: 2018-10-15 | Stop reason: SDUPTHER

## 2018-10-02 ENCOUNTER — TELEPHONE (OUTPATIENT)
Dept: INTERNAL MEDICINE CLINIC | Age: 78
End: 2018-10-02

## 2018-10-02 DIAGNOSIS — E03.9 HYPOTHYROIDISM, UNSPECIFIED TYPE: Primary | ICD-10-CM

## 2018-10-02 NOTE — TELEPHONE ENCOUNTER
Labs ordered. Called patient to let her know she could pick them up at the  and to have them done ~1 week prior to her appointment.     Verna Amin, PharmD, BCPS, CDE

## 2018-10-02 NOTE — TELEPHONE ENCOUNTER
----- Message from Makayla Reyez sent at 10/1/2018  5:06 PM EDT -----  Regarding: /Telephone   Pt is requesting a call back from Dr. Ani Brewer nurse. Her best contact number is [phone].

## 2018-10-03 ENCOUNTER — TELEPHONE (OUTPATIENT)
Dept: INTERNAL MEDICINE CLINIC | Age: 78
End: 2018-10-03

## 2018-10-09 LAB
ALBUMIN SERPL-MCNC: 4 G/DL (ref 3.5–4.8)
ALBUMIN/GLOB SERPL: 1.6 {RATIO} (ref 1.2–2.2)
ALP SERPL-CCNC: 61 IU/L (ref 39–117)
ALT SERPL-CCNC: 17 IU/L (ref 0–32)
AST SERPL-CCNC: 22 IU/L (ref 0–40)
BILIRUB SERPL-MCNC: 0.6 MG/DL (ref 0–1.2)
BUN SERPL-MCNC: 13 MG/DL (ref 8–27)
BUN/CREAT SERPL: 15 (ref 12–28)
CALCIUM SERPL-MCNC: 9 MG/DL (ref 8.7–10.3)
CHLORIDE SERPL-SCNC: 105 MMOL/L (ref 96–106)
CHOLEST SERPL-MCNC: 182 MG/DL (ref 100–199)
CO2 SERPL-SCNC: 22 MMOL/L (ref 20–29)
CREAT SERPL-MCNC: 0.86 MG/DL (ref 0.57–1)
GLOBULIN SER CALC-MCNC: 2.5 G/DL (ref 1.5–4.5)
GLUCOSE SERPL-MCNC: 83 MG/DL (ref 65–99)
HDLC SERPL-MCNC: 60 MG/DL
INTERPRETATION, 910389: NORMAL
LDLC SERPL CALC-MCNC: 100 MG/DL (ref 0–99)
POTASSIUM SERPL-SCNC: 4.3 MMOL/L (ref 3.5–5.2)
PROT SERPL-MCNC: 6.5 G/DL (ref 6–8.5)
SODIUM SERPL-SCNC: 141 MMOL/L (ref 134–144)
T4 FREE SERPL-MCNC: 1.37 NG/DL (ref 0.82–1.77)
TRIGL SERPL-MCNC: 109 MG/DL (ref 0–149)
TSH SERPL DL<=0.005 MIU/L-ACNC: 1.05 UIU/ML (ref 0.45–4.5)
VLDLC SERPL CALC-MCNC: 22 MG/DL (ref 5–40)

## 2018-10-15 ENCOUNTER — HOSPITAL ENCOUNTER (OUTPATIENT)
Dept: LAB | Age: 78
Discharge: HOME | End: 2018-10-15
Payer: MEDICARE

## 2018-10-15 ENCOUNTER — OFFICE VISIT (OUTPATIENT)
Dept: INTERNAL MEDICINE CLINIC | Age: 78
End: 2018-10-15

## 2018-10-15 VITALS
SYSTOLIC BLOOD PRESSURE: 114 MMHG | RESPIRATION RATE: 16 BRPM | WEIGHT: 106 LBS | HEART RATE: 66 BPM | DIASTOLIC BLOOD PRESSURE: 70 MMHG | HEIGHT: 61 IN | OXYGEN SATURATION: 97 % | BODY MASS INDEX: 20.01 KG/M2 | TEMPERATURE: 97.8 F

## 2018-10-15 DIAGNOSIS — E89.41 HOT FLASHES DUE TO SURGICAL MENOPAUSE: ICD-10-CM

## 2018-10-15 DIAGNOSIS — R39.15 URINARY URGENCY: ICD-10-CM

## 2018-10-15 DIAGNOSIS — Z78.0 POSTMENOPAUSAL: ICD-10-CM

## 2018-10-15 DIAGNOSIS — Z23 ENCOUNTER FOR IMMUNIZATION: ICD-10-CM

## 2018-10-15 DIAGNOSIS — E03.9 ACQUIRED HYPOTHYROIDISM: ICD-10-CM

## 2018-10-15 DIAGNOSIS — Z00.00 MEDICARE ANNUAL WELLNESS VISIT, SUBSEQUENT: Primary | ICD-10-CM

## 2018-10-15 DIAGNOSIS — F51.02 TRANSIENT INSOMNIA: ICD-10-CM

## 2018-10-15 PROCEDURE — 87086 URINE CULTURE/COLONY COUNT: CPT

## 2018-10-15 RX ORDER — NORETHINDRONE ACETATE AND ETHINYL ESTRADIOL 1; 5 MG/1; UG/1
TABLET ORAL
Qty: 84 TAB | Refills: 3 | Status: SHIPPED | OUTPATIENT
Start: 2018-10-15 | End: 2019-12-31 | Stop reason: SDUPTHER

## 2018-10-15 RX ORDER — ZOLPIDEM TARTRATE 5 MG/1
TABLET ORAL
Qty: 25 TAB | Refills: 2 | Status: SHIPPED | OUTPATIENT
Start: 2018-10-15 | End: 2020-10-19

## 2018-10-15 RX ORDER — LEVOTHYROXINE SODIUM 75 UG/1
TABLET ORAL
Qty: 90 TAB | Refills: 3 | Status: SHIPPED | OUTPATIENT
Start: 2018-10-15 | End: 2019-10-16 | Stop reason: SDUPTHER

## 2018-10-15 NOTE — PATIENT INSTRUCTIONS
Well Visit, Over 72: Care Instructions  Your Care Instructions    Physical exams can help you stay healthy. Your doctor has checked your overall health and may have suggested ways to take good care of yourself. He or she also may have recommended tests. At home, you can help prevent illness with healthy eating, regular exercise, and other steps. Follow-up care is a key part of your treatment and safety. Be sure to make and go to all appointments, and call your doctor if you are having problems. It's also a good idea to know your test results and keep a list of the medicines you take. How can you care for yourself at home? · Reach and stay at a healthy weight. This will lower your risk for many problems, such as obesity, diabetes, heart disease, and high blood pressure. · Get at least 30 minutes of exercise on most days of the week. Walking is a good choice. You also may want to do other activities, such as running, swimming, cycling, or playing tennis or team sports. · Do not smoke. Smoking can make health problems worse. If you need help quitting, talk to your doctor about stop-smoking programs and medicines. These can increase your chances of quitting for good. · Protect your skin from too much sun. When you're outdoors from 10 a.m. to 4 p.m., stay in the shade or cover up with clothing and a hat with a wide brim. Wear sunglasses that block UV rays. Even when it's cloudy, put broad-spectrum sunscreen (SPF 30 or higher) on any exposed skin. · See a dentist one or two times a year for checkups and to have your teeth cleaned. · Wear a seat belt in the car. · Limit alcohol to 2 drinks a day for men and 1 drink a day for women. Too much alcohol can cause health problems. Follow your doctor's advice about when to have certain tests. These tests can spot problems early. For men and women  · Cholesterol.  Your doctor will tell you how often to have this done based on your overall health and other things that can increase your risk for heart attack and stroke. · Blood pressure. Have your blood pressure checked during a routine doctor visit. Your doctor will tell you how often to check your blood pressure based on your age, your blood pressure results, and other factors. · Diabetes. Ask your doctor whether you should have tests for diabetes. · Vision. Experts recommend that you have yearly exams for glaucoma and other age-related eye problems. · Hearing. Tell your doctor if you notice any change in your hearing. You can have tests to find out how well you hear. · Colon cancer tests. Keep having colon cancer tests as your doctor recommends. You can have one of several types of tests. · Heart attack and stroke risk. At least every 4 to 6 years, you should have your risk for heart attack and stroke assessed. Your doctor uses factors such as your age, blood pressure, cholesterol, and whether you smoke or have diabetes to show what your risk for a heart attack or stroke is over the next 10 years. · Osteoporosis. Talk to your doctor about whether you should have a bone density test to find out whether you have thinning bones. Also ask your doctor about whether you should take calcium and vitamin D supplements. For women  · Pap test and pelvic exam. You may no longer need a Pap test. Talk with your doctor about whether to stop or continue to have Pap tests. · Breast exam and mammogram. Ask how often you should have a mammogram, which is an X-ray of your breasts. A mammogram can spot breast cancer before it can be felt and when it is easiest to treat. · Thyroid disease. Talk to your doctor about whether to have your thyroid checked as part of a regular physical exam. Women have an increased chance of a thyroid problem. For men  · Prostate exam. Talk to your doctor about whether you should have a blood test (called a PSA test) for prostate cancer.  Experts disagree on whether men should have this test. Some experts recommend that you discuss the benefits and risks of the test with your doctor. · Abdominal aortic aneurysm. Ask your doctor whether you should have a test to check for an aneurysm. You may need a test if you ever smoked or if your parent, brother, sister, or child has had an aneurysm. When should you call for help? Watch closely for changes in your health, and be sure to contact your doctor if you have any problems or symptoms that concern you. Where can you learn more? Go to http://roger-caity.info/. Enter H035 in the search box to learn more about \"Well Visit, Over 65: Care Instructions. \"  Current as of: March 29, 2018  Content Version: 11.8  © 4199-6240 Healthwise, [employer]Box. Care instructions adapted under license by [employer] (which disclaims liability or warranty for this information). If you have questions about a medical condition or this instruction, always ask your healthcare professional. [place] [address] any warranty or liability for your use of this information. Medicare Wellness Visit, Female     The best way to live healthy is to have a lifestyle where you eat a well-balanced diet, exercise regularly, limit alcohol use, and quit all forms of tobacco/nicotine, if applicable. Regular preventive services are another way to keep healthy. Preventive services (vaccines, screening tests, monitoring & exams) can help personalize your care plan, which helps you manage your own care. Screening tests can find health problems at the earliest stages, when they are easiest to treat. [facility] follows the current, evidence-based guidelines published by the [facility] [place] (USPSTF) when recommending preventive services for our patients. Because we follow these guidelines, sometimes recommendations change over time as research supports it. (For example, mammograms used to be recommended annually.  Even though Medicare will still pay for an annual mammogram, the newer guidelines recommend a mammogram every two years for women of average risk.)  Of course, you and your doctor may decide to screen more often for some diseases, based on your risk and your health status. Preventive services for you include:  - Medicare offers their members a free annual wellness visit, which is time for you and your primary care provider to discuss and plan for your preventive service needs. Take advantage of this benefit every year!  -All adults over the age of 72 should receive the recommended pneumonia vaccines. Current USPSTF guidelines recommend a series of two vaccines for the best pneumonia protection.   -All adults should have a flu vaccine yearly and a tetanus vaccine every 10 years. All adults age 61 and older should receive a shingles vaccine once in their lifetime.    -A bone mass density test is recommended when a woman turns 65 to screen for osteoporosis. This test is only recommended one time, as a screening. Some providers will use this same test as a disease monitoring tool if you already have osteoporosis. -All adults age 38-68 who are overweight should have a diabetes screening test once every three years.   -Other screening tests and preventive services for persons with diabetes include: an eye exam to screen for diabetic retinopathy, a kidney function test, a foot exam, and stricter control over your cholesterol.   -Cardiovascular screening for adults with routine risk involves an electrocardiogram (ECG) at intervals determined by your doctor.   -Colorectal cancer screenings should be done for adults age 54-65 with no increased risk factors for colorectal cancer. There are a number of acceptable methods of screening for this type of cancer. Each test has its own benefits and drawbacks. Discuss with your doctor what is most appropriate for you during your annual wellness visit.  The different tests include: colonoscopy (considered the best screening method), a fecal occult blood test, a fecal DNA test, and sigmoidoscopy. -Breast cancer screenings are recommended every other year for women of normal risk, age 54-69.  -Cervical cancer screenings for women over age 72 are only recommended with certain risk factors.   -All adults born between [facility] should be screened once for Hepatitis C.      Here is a list of your current Health Maintenance items (your personalized list of preventive services) with a due date:  Health Maintenance Due   Topic Date Due    Shingles Vaccine (1 of 2) 02/23/1992    Flu Vaccine  08/01/2018    Annual Well Visit  10/05/2018    Glaucoma Screening   11/08/2018

## 2018-10-15 NOTE — MR AVS SNAPSHOT
[address] [place] [address] 
 
 
 [address] [place] [address] [facility] 
[phone] Patient: Sarabjit Pena MRN:  [id number] Visit Information Date & Time Provider Department Dept. Phone Encounter #  
 10/15/2018  1:30 PM Phan De Luna MD [facility] [address] [id number] Upcoming Health Maintenance Date Due Shingrix Vaccine Age 50> (1 of 2) 1992 Influenza Age 5 to Adult 2018 MEDICARE YEARLY EXAM 10/5/2018 GLAUCOMA SCREENING Q2Y 2018 COLONOSCOPY 2020 DTaP/Tdap/Td series (2 - Td) 2024 Allergies as of 10/15/2018  Review Complete On: 10/18/2017 By: Phan De Luna MD  
 No Known Allergies Current Immunizations  Reviewed on 10/15/2018 Name Date Hepatitis A Vaccine 2008, 2008 Influenza High Dose Vaccine PF 10/11/2017, 2016, 10/5/2015 Pneumococcal Conjugate (PCV-13) 10/5/2015 TD Vaccine 10/6/2004 Tdap 2014 ZZZ-RETIRED (DO NOT USE) Pneumococcal Vaccine (Unspecified Type) 3/17/2010 11:18 AM  
 Zoster 3/1/2007 Reviewed by Phan De Luna MD on 10/15/2018 at  1:59 PM  
You Were Diagnosed With   
  
 Codes Comments Medicare annual wellness visit, subsequent    -  Primary ICD-10-CM: Z00.00 ICD-9-CM: V70.0 Transient insomnia     ICD-10-CM: F51.02 
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 
ICD-9-CM: 627.4 Urinary urgency     ICD-10-CM: R39.15 ICD-9-CM: 313.35 Postmenopausal     ICD-10-CM: Z78.0 ICD-9-CM: V49.81 Vitals BP Pulse Temp Resp Height(growth percentile) Weight(growth percentile) 114/70 (BP 1 Location: Left arm, BP Patient Position: Sitting) 66 97.8 °F (36.6 °C) (Oral) 16 5' 1\" (1.549 m) 106 lb (48.1 kg) SpO2 BMI OB Status Smoking Status 97% 20.03 kg/m2 Postmenopausal Never Smoker Vitals History BMI and BSA Data Body Mass Index Body Surface Area 20.03 kg/m 2 1.44 m 2 Preferred Pharmacy Pharmacy Name Phone  [address] [phone] Your Updated Medication List  
  
   
This list is accurate as of 10/15/18  2:00 PM.  Always use your most recent med list.  
  
  
  
  
 aspirin 81 mg tablet Take 81 mg by mouth. CALTRATE 600 PO  
take 600 mg by mouth daily. NUCBSDCH-GTZLSTBCXL-VA GLYCN-C PO  
take  by mouth. LEVOXYL 75 mcg tablet Generic drug:  levothyroxine TAKE 1 TABLET DAILY BEFORE BREAKFAST  
  
 multivitamin tablet Commonly known as:  ONE A DAY Take 1 Tab by mouth daily. norethindrone acetate-ethinyl estradiol 1-5 mg-mcg Tab Commonly known as:  FYAVOLV  
TAKE 1 TABLET DAILY  
  
 varicella-zoster recombinant (PF) 50 mcg/0.5 mL Susr injection Commonly known as:  SHINGRIX  
0.5 mL by IntraMUSCular route once for 1 dose. VITAMIN D2 PO Take 1,000 mg by mouth daily. zolpidem 5 mg tablet Commonly known as:  AMBIEN  
take 1 tablet by mouth NIGHTLY if needed for sleep Prescriptions Printed Refills  
 varicella-zoster recombinant, PF, (SHINGRIX) 50 mcg/0.5 mL susr injection 0 Si.5 mL by IntraMUSCular route once for 1 dose. Class: Print Route: IntraMUSCular  
 zolpidem (AMBIEN) 5 mg tablet 2 Sig: take 1 tablet by mouth NIGHTLY if needed for sleep Class: Print Prescriptions Sent to Pharmacy Refills LEVOXYL 75 mcg tablet 3 Sig: TAKE 1 TABLET DAILY BEFORE BREAKFAST Class: Normal  
 Pharmacy: [facility] [address] Ph #: [phone]  
 norethindrone acetate-ethinyl estradiol (FYAVOLV) 1-5 mg-mcg tab 3 Sig: TAKE 1 TABLET DAILY Class: Normal  
 Pharmacy: [facility] [address] Ph #: [phone] We Performed the Following CULTURE, URINE [id number] CPT(R)] To-Do List   
 10/15/2018 Imaging:  DEXA BONE DENSITY STUDY AXIAL Patient Instructions Well Visit, Over 72: Care Instructions Your Care Instructions Physical exams can help you stay healthy. Your doctor has checked your overall health and may have suggested ways to take good care of yourself. He or she also may have recommended tests. At home, you can help prevent illness with healthy eating, regular exercise, and other steps. Follow-up care is a key part of your treatment and safety. Be sure to make and go to all appointments, and call your doctor if you are having problems. It's also a good idea to know your test results and keep a list of the medicines you take. How can you care for yourself at home? · Reach and stay at a healthy weight. This will lower your risk for many problems, such as obesity, diabetes, heart disease, and high blood pressure. · Get at least 30 minutes of exercise on most days of the week. Walking is a good choice. You also may want to do other activities, such as running, swimming, cycling, or playing tennis or team sports. · Do not smoke. Smoking can make health problems worse. If you need help quitting, talk to your doctor about stop-smoking programs and medicines. These can increase your chances of quitting for good. · Protect your skin from too much sun. When you're outdoors from 10 a.m. to 4 p.m., stay in the shade or cover up with clothing and a hat with a wide brim. Wear sunglasses that block UV rays. Even when it's cloudy, put broad-spectrum sunscreen (SPF 30 or higher) on any exposed skin. · See a dentist one or two times a year for checkups and to have your teeth cleaned. · Wear a seat belt in the car. · Limit alcohol to 2 drinks a day for men and 1 drink a day for women. Too much alcohol can cause health problems. Follow your doctor's advice about when to have certain tests. These tests can spot problems early. For men and women · Cholesterol. Your doctor will tell you how often to have this done based on your overall health and other things that can increase your risk for heart attack and stroke. · Blood pressure. Have your blood pressure checked during a routine doctor visit. Your doctor will tell you how often to check your blood pressure based on your age, your blood pressure results, and other factors. · Diabetes. Ask your doctor whether you should have tests for diabetes. · Vision. Experts recommend that you have yearly exams for glaucoma and other age-related eye problems. · Hearing. Tell your doctor if you notice any change in your hearing. You can have tests to find out how well you hear. · Colon cancer tests. Keep having colon cancer tests as your doctor recommends. You can have one of several types of tests. · Heart attack and stroke risk. At least every 4 to 6 years, you should have your risk for heart attack and stroke assessed. Your doctor uses factors such as your age, blood pressure, cholesterol, and whether you smoke or have diabetes to show what your risk for a heart attack or stroke is over the next 10 years. · Osteoporosis. Talk to your doctor about whether you should have a bone density test to find out whether you have thinning bones. Also ask your doctor about whether you should take calcium and vitamin D supplements. For women · Pap test and pelvic exam. You may no longer need a Pap test. Talk with your doctor about whether to stop or continue to have Pap tests. · Breast exam and mammogram. Ask how often you should have a mammogram, which is an X-ray of your breasts. A mammogram can spot breast cancer before it can be felt and when it is easiest to treat. · Thyroid disease. Talk to your doctor about whether to have your thyroid checked as part of a regular physical exam. Women have an increased chance of a thyroid problem. For men · Prostate exam. Talk to your doctor about whether you should have a blood test (called a PSA test) for prostate cancer. Experts disagree on whether men should have this test. Some experts recommend that you discuss the benefits and risks of the test with your doctor. · Abdominal aortic aneurysm. Ask your doctor whether you should have a test to check for an aneurysm. You may need a test if you ever smoked or if your parent, brother, sister, or child has had an aneurysm. When should you call for help? Watch closely for changes in your health, and be sure to contact your doctor if you have any problems or symptoms that concern you. Where can you learn more? Go to http://roger-caity.info/. Enter G459 in the search box to learn more about \"Well Visit, Over 65: Care Instructions. \" Current as of: March 29, 2018 Content Version: 11.8 © 7961-1377 Sticher. Care instructions adapted under license by [employer] (which disclaims liability or warranty for this information). If you have questions about a medical condition or this instruction, always ask your healthcare professional. [place] [address] any warranty or liability for your use of this information. Introducing [facility]! Dear Kang Romo: 
Thank you for requesting a [employer] account. Our records indicate that you already have an active [employer] account. You can access your account anytime at [email or web address]) Did you know that you can access your hospital and ER discharge instructions at any time in Lum[employer]? You can also review all of your test results from your hospital stay or ER visit. Additional Information If you have questions, please visit the Frequently Asked Questions section of the Lum[employer] website at [email or web address]. Remember, [employer] is NOT to be used for urgent needs. For medical emergencies, dial 911. Now available from your iPhone and Android! Please provide this summary of care documentation to your next provider. Your primary care clinician is listed as [address] [address]. If you have any questions after today's visit, please call [phone].

## 2018-10-15 NOTE — PROGRESS NOTES
HISTORY OF PRESENT ILLNESS  Ernie Nageotte is a 70 y.o. female. HPI  Patient is seen for followup of hypothyroidism. Thyroid ROS: denies fatigue, weight changes, heat/cold intolerance, bowel/skin changes or CVS symptoms. Uses ambien rarely and would like refill. Very involved with 3 grandchildren and very active with golf. Dt has fibromyalgia  Osteoporosis in forearm and does exercise and will repeat dexa  mammo in 2018 , colonoscopy in 2010  Order for shingrix provided  Review of Systems   Constitutional: Negative for chills, fever and weight loss. Respiratory: Negative for cough, shortness of breath and wheezing. Cardiovascular: Negative for chest pain, palpitations, orthopnea, leg swelling and PND. Gastrointestinal: Positive for heartburn (occas). Negative for abdominal pain, constipation, diarrhea and nausea. Genitourinary: Positive for urgency. Negative for dysuria and hematuria. Musculoskeletal: Negative for myalgias. Neurological: Negative for dizziness and headaches. Psychiatric/Behavioral: Negative for depression. All other systems reviewed and are negative. Physical Exam   Constitutional: She is oriented to person, place, and time. She appears well-developed and well-nourished. HENT:   Head: Normocephalic and atraumatic. Right Ear: Tympanic membrane, external ear and ear canal normal.   Left Ear: Tympanic membrane, external ear and ear canal normal.   Nose: Nose normal.   Mouth/Throat: Oropharynx is clear and moist and mucous membranes are normal. No oropharyngeal exudate. Eyes: Conjunctivae are normal. Pupils are equal, round, and reactive to light. Right eye exhibits no discharge. Left eye exhibits no discharge. Neck: Normal range of motion. Neck supple. Carotid bruit is not present. No thyromegaly present. Cardiovascular: Normal rate, regular rhythm, S1 normal, S2 normal, normal heart sounds and intact distal pulses. No murmur heard.   Pulmonary/Chest: Effort normal and breath sounds normal. No respiratory distress. She has no wheezes. She has no rales. Breast exam bilaterally without masses axillary nodes or discharge. BSE reviewed      Musculoskeletal: She exhibits no edema. Lymphadenopathy:     She has no cervical adenopathy. Neurological: She is alert and oriented to person, place, and time. She has normal reflexes. Skin: Skin is warm and dry. Psychiatric: She has a normal mood and affect. Her behavior is normal.   Nursing note and vitals reviewed. ASSESSMENT and PLAN  Diagnoses and all orders for this visit:    1. Medicare annual wellness visit, subsequent  -     varicella-zoster recombinant, PF, (SHINGRIX) 50 mcg/0.5 mL susr injection; 0.5 mL by IntraMUSCular route once for 1 dose. 2. Transient insomnia  -     zolpidem (AMBIEN) 5 mg tablet; take 1 tablet by mouth NIGHTLY if needed for sleep    3. Acquired hypothyroidism  -     LEVOXYL 75 mcg tablet; TAKE 1 TABLET DAILY BEFORE BREAKFAST    4. 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 Urinary urgency  -     CULTURE, URINE    6. Postmenopausal  -     DEXA BONE DENSITY STUDY AXIAL; Future    7. Encounter for immunization  -     ADMIN INFLUENZA VIRUS VAC  -     INFLUENZA VACCINE INACTIVATED (IIV), SUBUNIT, ADJUVANTED, IM        This is the Subsequent Medicare Annual Wellness Exam, performed 12 months or more after the Initial AWV or the last Subsequent AWV    I have reviewed the patient's medical history in detail and updated the computerized patient record.      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    Skin cancer     Squamous cell carcinoma of lower leg 12/18/12    treated with Mohs surgery    Sun-damaged skin     Sunburn, blistering       Past Surgical History:   Procedure Laterality Date    AMB POC MOHS 1 STAGE T/A/L  12/18/12    squamous cell carcinoma-right lower lateral leg    BREAST SURGERY PROCEDURE UNLISTED      breast cyst    ENDOSCOPY, COLON, DIAGNOSTIC  2/2/10    1/10 Dr Amy Sofia 7 year follow up    HX GYN      d and c    HX HEENT  4/13    right leg basal cell resected    HX HYSTERECTOMY  5/26/2015    Therisa Breed with cystocele repair and being peritoneal cyst repair    HX ORTHOPAEDIC      bunion repair bilaterally    HX ORTHOPAEDIC      left hammartoe repair    HX ORTHOPAEDIC  9/13    left shoulder surgery dr leigh Lozoya ORTHOPAEDIC  6/17/15    left 4th toe surgery    HX TONSILLECTOMY       Current Outpatient Prescriptions   Medication Sig Dispense Refill    varicella-zoster recombinant, PF, (SHINGRIX) 50 mcg/0.5 mL susr injection 0.5 mL by IntraMUSCular route once for 1 dose. 0.5 mL 0    zolpidem (AMBIEN) 5 mg tablet take 1 tablet by mouth NIGHTLY if needed for sleep 25 Tab 2    LEVOXYL 75 mcg tablet TAKE 1 TABLET DAILY BEFORE BREAKFAST 90 Tab 3    norethindrone acetate-ethinyl estradiol (FYAVOLV) 1-5 mg-mcg tab TAKE 1 TABLET DAILY 84 Tab 3    multivitamin (ONE A DAY) tablet Take 1 Tab by mouth daily.  aspirin 81 mg tablet Take 81 mg by mouth.  ERGOCALCIFEROL, VITAMIN D2, (VITAMIN D PO) Take 1,000 mg by mouth daily.  QPILBTIN-PSVYLLAIUU-RC GLYCN-C PO take  by mouth.  CALTRATE 600 PO take 600 mg by mouth daily.        No Known Allergies  Family History   Problem Relation Age of Onset    Heart Failure Mother     Thyroid Disease Mother     Cancer Mother      uterine ca, thyroid ca    Cancer Father      pancreatic ca age 43     Social History   Substance Use Topics    Smoking status: Never Smoker    Smokeless tobacco: Never Used    Alcohol use 2.0 oz/week     4 Glasses of wine per week      Comment: socially     Patient Active Problem List   Diagnosis Code    Fibrocystic breast N60.19    Keratitis sicca, bilateral H16.223    Cancer of skin, squamous cell C44.92    Unspecified hypothyroidism E03.9    Osteoporosis M81.0    Advanced care planning/counseling discussion Z71.89       Depression Risk Factor Screening:     PHQ over the last two weeks 10/15/2018   Little interest or pleasure in doing things Not at all   Feeling down, depressed, irritable, or hopeless Not at all   Total Score PHQ 2 0     Alcohol Risk Factor Screening: You do not drink alcohol or very rarely. Functional Ability and Level of Safety:   Hearing Loss  Hearing is good. Activities of Daily Living  The home contains: no safety equipment. Patient does total self care    Fall Risk  Fall Risk Assessment, last 12 mths 10/15/2018   Able to walk? Yes   Fall in past 12 months? No       Abuse Screen  Patient is not abused    Cognitive Screening   Evaluation of Cognitive Function:  Has your family/caregiver stated any concerns about your memory: no  Normal    Patient Care Team   Patient Care Team:  Enedina Massey MD as PCP - General    Assessment/Plan   Education and counseling provided:  Are appropriate based on today's review and evaluation  End-of-Life planning (with patient's consent)  Pneumococcal Vaccine  Influenza Vaccine  Screening Mammography  Bone mass measurement (DEXA)  Screening for glaucoma    Diagnoses and all orders for this visit:    1. Medicare annual wellness visit, subsequent  -     varicella-zoster recombinant, PF, (SHINGRIX) 50 mcg/0.5 mL susr injection; 0.5 mL by IntraMUSCular route once for 1 dose. 2. Transient insomnia  -     zolpidem (AMBIEN) 5 mg tablet; take 1 tablet by mouth NIGHTLY if needed for sleep    3. Acquired hypothyroidism  -     LEVOXYL 75 mcg tablet; TAKE 1 TABLET DAILY BEFORE BREAKFAST    4. 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 Urinary urgency  -     CULTURE, URINE    6.  Postmenopausal  -     DEXA BONE DENSITY STUDY AXIAL; Future    7.  Encounter for immunization  -     ADMIN INFLUENZA VIRUS VAC  -     INFLUENZA VACCINE INACTIVATED (IIV), SUBUNIT, ADJUVANTED, IM        Health Maintenance Due   Topic Date Due    Shingrix Vaccine Age 49> (1 of 2) 02/23/1992    Influenza Age 5 to Adult  08/01/2018    MEDICARE YEARLY EXAM  10/05/2018    GLAUCOMA SCREENING Q2Y  11/08/2018

## 2018-10-17 LAB — BACTERIA UR CULT: NORMAL

## 2018-10-22 ENCOUNTER — HOSPITAL ENCOUNTER (OUTPATIENT)
Dept: BONE DENSITY | Age: 78
Discharge: HOME | End: 2018-10-22
Attending: INTERNAL MEDICINE
Payer: MEDICARE

## 2018-10-22 DIAGNOSIS — Z78.0 POSTMENOPAUSAL: ICD-10-CM

## 2018-10-22 PROCEDURE — 77080 DXA BONE DENSITY AXIAL: CPT

## 2019-05-10 ENCOUNTER — TELEPHONE (OUTPATIENT)
Dept: INTERNAL MEDICINE CLINIC | Age: 79
End: 2019-05-10

## 2019-05-10 NOTE — TELEPHONE ENCOUNTER
V/m left advising patient can see PCP or an ENT for eval. Info to Ras Carr with [facility] provided & info to [facility] provided as well. Office number left if patient has additional questions or wants to schedule an appt with PCP.

## 2019-05-10 NOTE — TELEPHONE ENCOUNTER
Patient is requesting to speak with the nurse or PCP in regards to sinus headaches , wants to know if she needs to see PCP or  A specialist  provider  , she can be reached at [phone]

## 2019-06-06 ENCOUNTER — TELEPHONE (OUTPATIENT)
Dept: INTERNAL MEDICINE CLINIC | Age: 79
End: 2019-06-06

## 2019-10-01 ENCOUNTER — TELEPHONE (OUTPATIENT)
Dept: INTERNAL MEDICINE CLINIC | Age: 79
End: 2019-10-01

## 2019-10-01 DIAGNOSIS — E03.4 HYPOTHYROIDISM DUE TO ACQUIRED ATROPHY OF THYROID: Primary | ICD-10-CM

## 2019-10-01 DIAGNOSIS — R53.83 FATIGUE, UNSPECIFIED TYPE: ICD-10-CM

## 2019-10-01 DIAGNOSIS — E78.00 ELEVATED LDL CHOLESTEROL LEVEL: ICD-10-CM

## 2019-10-08 ENCOUNTER — HOSPITAL ENCOUNTER (OUTPATIENT)
Dept: LAB | Age: 79
Discharge: HOME | End: 2019-10-08
Payer: MEDICARE

## 2019-10-08 PROCEDURE — 84443 ASSAY THYROID STIM HORMONE: CPT

## 2019-10-08 PROCEDURE — 36415 COLL VENOUS BLD VENIPUNCTURE: CPT

## 2019-10-08 PROCEDURE — 84439 ASSAY OF FREE THYROXINE: CPT

## 2019-10-08 PROCEDURE — 85025 COMPLETE CBC W/AUTO DIFF WBC: CPT

## 2019-10-08 PROCEDURE — 80053 COMPREHEN METABOLIC PANEL: CPT

## 2019-10-08 PROCEDURE — 80061 LIPID PANEL: CPT

## 2019-10-09 LAB
ALBUMIN SERPL-MCNC: 4.4 G/DL (ref 3.5–4.8)
ALBUMIN/GLOB SERPL: 1.7 {RATIO} (ref 1.2–2.2)
ALP SERPL-CCNC: 60 IU/L (ref 39–117)
ALT SERPL-CCNC: 17 IU/L (ref 0–32)
AST SERPL-CCNC: 27 IU/L (ref 0–40)
BASOPHILS # BLD AUTO: 0.1 X10E3/UL (ref 0–0.2)
BASOPHILS NFR BLD AUTO: 1 %
BILIRUB SERPL-MCNC: 0.7 MG/DL (ref 0–1.2)
BUN SERPL-MCNC: 16 MG/DL (ref 8–27)
BUN/CREAT SERPL: 17 (ref 12–28)
CALCIUM SERPL-MCNC: 9.5 MG/DL (ref 8.7–10.3)
CHLORIDE SERPL-SCNC: 104 MMOL/L (ref 96–106)
CHOLEST SERPL-MCNC: 188 MG/DL (ref 100–199)
CO2 SERPL-SCNC: 24 MMOL/L (ref 20–29)
CREAT SERPL-MCNC: 0.95 MG/DL (ref 0.57–1)
EOSINOPHIL # BLD AUTO: 0.2 X10E3/UL (ref 0–0.4)
EOSINOPHIL NFR BLD AUTO: 3 %
ERYTHROCYTE [DISTWIDTH] IN BLOOD BY AUTOMATED COUNT: 12.6 % (ref 12.3–15.4)
GLOBULIN SER CALC-MCNC: 2.6 G/DL (ref 1.5–4.5)
GLUCOSE SERPL-MCNC: 83 MG/DL (ref 65–99)
HCT VFR BLD AUTO: 43.4 % (ref 34–46.6)
HDLC SERPL-MCNC: 64 MG/DL
HGB BLD-MCNC: 14.9 G/DL (ref 11.1–15.9)
IMM GRANULOCYTES # BLD AUTO: 0 X10E3/UL (ref 0–0.1)
IMM GRANULOCYTES NFR BLD AUTO: 0 %
INTERPRETATION, 910389: NORMAL
INTERPRETATION: NORMAL
LDLC SERPL CALC-MCNC: 111 MG/DL (ref 0–99)
LYMPHOCYTES # BLD AUTO: 2.7 X10E3/UL (ref 0.7–3.1)
LYMPHOCYTES NFR BLD AUTO: 33 %
MCH RBC QN AUTO: 30.7 PG (ref 26.6–33)
MCHC RBC AUTO-ENTMCNC: 34.3 G/DL (ref 31.5–35.7)
MCV RBC AUTO: 90 FL (ref 79–97)
MONOCYTES # BLD AUTO: 0.6 X10E3/UL (ref 0.1–0.9)
MONOCYTES NFR BLD AUTO: 7 %
NEUTROPHILS # BLD AUTO: 4.7 X10E3/UL (ref 1.4–7)
NEUTROPHILS NFR BLD AUTO: 56 %
PDF IMAGE, 910387: NORMAL
PLATELET # BLD AUTO: 310 X10E3/UL (ref 150–450)
POTASSIUM SERPL-SCNC: 4.5 MMOL/L (ref 3.5–5.2)
PROT SERPL-MCNC: 7 G/DL (ref 6–8.5)
RBC # BLD AUTO: 4.85 X10E6/UL (ref 3.77–5.28)
SODIUM SERPL-SCNC: 140 MMOL/L (ref 134–144)
T4 FREE SERPL-MCNC: 1.5 NG/DL (ref 0.82–1.77)
TRIGL SERPL-MCNC: 66 MG/DL (ref 0–149)
TSH SERPL DL<=0.005 MIU/L-ACNC: 2.31 UIU/ML (ref 0.45–4.5)
VLDLC SERPL CALC-MCNC: 13 MG/DL (ref 5–40)
WBC # BLD AUTO: 8.4 X10E3/UL (ref 3.4–10.8)

## 2019-10-16 ENCOUNTER — OFFICE VISIT (OUTPATIENT)
Dept: INTERNAL MEDICINE CLINIC | Age: 79
End: 2019-10-16

## 2019-10-16 ENCOUNTER — HOSPITAL ENCOUNTER (OUTPATIENT)
Dept: LAB | Age: 79
Discharge: HOME | End: 2019-10-16
Payer: MEDICARE

## 2019-10-16 VITALS
SYSTOLIC BLOOD PRESSURE: 113 MMHG | HEART RATE: 64 BPM | BODY MASS INDEX: 19.63 KG/M2 | RESPIRATION RATE: 16 BRPM | DIASTOLIC BLOOD PRESSURE: 67 MMHG | TEMPERATURE: 97.7 F | HEIGHT: 61 IN | OXYGEN SATURATION: 97 % | WEIGHT: 104 LBS

## 2019-10-16 DIAGNOSIS — Z23 ENCOUNTER FOR IMMUNIZATION: ICD-10-CM

## 2019-10-16 DIAGNOSIS — Z00.00 MEDICARE ANNUAL WELLNESS VISIT, SUBSEQUENT: Primary | ICD-10-CM

## 2019-10-16 DIAGNOSIS — E03.9 ACQUIRED HYPOTHYROIDISM: ICD-10-CM

## 2019-10-16 DIAGNOSIS — E03.4 HYPOTHYROIDISM DUE TO ACQUIRED ATROPHY OF THYROID: ICD-10-CM

## 2019-10-16 DIAGNOSIS — Z91.89 GYN EXAM FOR HIGH-RISK MEDICARE PATIENT: ICD-10-CM

## 2019-10-16 DIAGNOSIS — Z90.711 S/P PARTIAL HYSTERECTOMY: ICD-10-CM

## 2019-10-16 DIAGNOSIS — Z78.0 POSTMENOPAUSAL: ICD-10-CM

## 2019-10-16 DIAGNOSIS — R30.9 URINARY PAIN: ICD-10-CM

## 2019-10-16 LAB
BILIRUB UR QL STRIP: NEGATIVE
GLUCOSE UR-MCNC: NEGATIVE MG/DL
KETONES P FAST UR STRIP-MCNC: NEGATIVE MG/DL
PH UR STRIP: 5.5 [PH] (ref 4.6–8)
PROT UR QL STRIP: NEGATIVE
SP GR UR STRIP: 1 (ref 1–1.03)
UA UROBILINOGEN AMB POC: NORMAL (ref 0.2–1)
URINALYSIS CLARITY POC: CLEAR
URINALYSIS COLOR POC: YELLOW
URINE BLOOD POC: NORMAL
URINE LEUKOCYTES POC: NORMAL
URINE NITRITES POC: NEGATIVE

## 2019-10-16 PROCEDURE — 88175 CYTOPATH C/V AUTO FLUID REDO: CPT

## 2019-10-16 RX ORDER — LEVOTHYROXINE SODIUM 75 UG/1
TABLET ORAL
Qty: 90 TAB | Refills: 3 | Status: SHIPPED | OUTPATIENT
Start: 2019-10-16 | End: 2020-11-11

## 2019-10-16 NOTE — PATIENT INSTRUCTIONS
nasacort aq nasal spray daily  Colonoscopy in 2020-let gi doctor know that you want a smaller volume prep  Dr Roque Yip at [facility] is a good gi doctor  Medicare Wellness Visit, Female     The best way to live healthy is to have a lifestyle where you eat a well-balanced diet, exercise regularly, limit alcohol use, and quit all forms of tobacco/nicotine, if applicable. Regular preventive services are another way to keep healthy. Preventive services (vaccines, screening tests, monitoring & exams) can help personalize your care plan, which helps you manage your own care. Screening tests can find health problems at the earliest stages, when they are easiest to treat. [facility] follows the current, evidence-based guidelines published by the [facility] States [place] Galilea ([facility]) when recommending preventive services for our patients. Because we follow these guidelines, sometimes recommendations change over time as research supports it. (For example, mammograms used to be recommended annually. Even though Medicare will still pay for an annual mammogram, the newer guidelines recommend a mammogram every two years for women of average risk.)  Of course, you and your doctor may decide to screen more often for some diseases, based on your risk and your health status. Preventive services for you include:  - Medicare offers their members a free annual wellness visit, which is time for you and your primary care provider to discuss and plan for your preventive service needs. Take advantage of this benefit every year!  -All adults over the age of 72 should receive the recommended pneumonia vaccines. Current USPSTF guidelines recommend a series of two vaccines for the best pneumonia protection.   -All adults should have a flu vaccine yearly and a tetanus vaccine every 10 years.  All adults age 61 and older should receive a shingles vaccine once in their lifetime.    -A bone mass density test is recommended when a woman turns 72 to screen for osteoporosis. This test is only recommended one time, as a screening. Some providers will use this same test as a disease monitoring tool if you already have osteoporosis. -All adults age 38-68 who are overweight should have a diabetes screening test once every three years.   -Other screening tests and preventive services for persons with diabetes include: an eye exam to screen for diabetic retinopathy, a kidney function test, a foot exam, and stricter control over your cholesterol.   -Cardiovascular screening for adults with routine risk involves an electrocardiogram (ECG) at intervals determined by your doctor.   -Colorectal cancer screenings should be done for adults age 54-65 with no increased risk factors for colorectal cancer. There are a number of acceptable methods of screening for this type of cancer. Each test has its own benefits and drawbacks. Discuss with your doctor what is most appropriate for you during your annual wellness visit. The different tests include: colonoscopy (considered the best screening method), a fecal occult blood test, a fecal DNA test, and sigmoidoscopy. -Breast cancer screenings are recommended every other year for women of normal risk, age 54-69.  -Cervical cancer screenings for women over age 72 are only recommended with certain risk factors.   -All adults born between [facility] should be screened once for Hepatitis C.      Here is a list of your current Health Maintenance items (your personalized list of preventive services) with a due date:  Health Maintenance Due   Topic Date Due    Pneumococcal Vaccine (2 of 2 - PPSV23) 10/05/2016    Glaucoma Screening   11/08/2018    Annual Well Visit  10/16/2019    Colonoscopy  02/02/2020

## 2019-10-17 NOTE — PROGRESS NOTES
Subjective:   70 y.o. female for Well Woman Check. No LMP recorded. Patient is postmenopausal.  Sp hyst with dr Kathie Suresh History: single partner, contraception - status post hysterectomy. Pertinent past medical hstory: pap 5 years ago normla  dexa 10/18 osteopenia very active. mammo every year  Colonoscopy 2010  Dt has fibromyalgia and traveling to [place] for possible cervical spine surgery    Patient Active Problem List    Diagnosis Date Noted    S/P partial hysterectomy 10/16/2019    Advanced care planning/counseling discussion 10/04/2017    Osteoporosis 10/19/2016    Unspecified hypothyroidism 07/20/2015    Cancer of skin, squamous cell 12/18/2012    Fibrocystic breast 03/21/2011    Keratitis sicca, bilateral 03/21/2011     Current Outpatient Medications   Medication Sig Dispense Refill    LEVOXYL 75 mcg tablet TAKE 1 TABLET DAILY BEFORE BREAKFAST 90 Tab 3    zolpidem (AMBIEN) 5 mg tablet take 1 tablet by mouth NIGHTLY if needed for sleep 25 Tab 2    norethindrone acetate-ethinyl estradiol (FYAVOLV) 1-5 mg-mcg tab TAKE 1 TABLET DAILY 84 Tab 3    multivitamin (ONE A DAY) tablet Take 1 Tab by mouth daily.  aspirin 81 mg tablet Take 81 mg by mouth.  ERGOCALCIFEROL, VITAMIN D2, (VITAMIN D PO) Take 1,000 mg by mouth daily.  HEQOVZSZ-XJJVZQEXCW-EU GLYCN-C PO take  by mouth.  CALTRATE 600 PO take 600 mg by mouth daily.        No Known Allergies  Past Medical History:   Diagnosis Date    Fibrocystic breast 3/21/2011    Hypothyroid 3/10/2009    Keratitis sicca, bilateral 3/21/2011    Skin cancer     Squamous cell carcinoma of lower leg 12/18/12    treated with Mohs surgery    Sun-damaged skin     Sunburn, blistering      Past Surgical History:   Procedure Laterality Date    AMB POC MOHS 1 STAGE T/A/L  12/18/12    squamous cell carcinoma-right lower lateral leg    BREAST SURGERY PROCEDURE UNLISTED      breast cyst    ENDOSCOPY, COLON, DIAGNOSTIC  2/2/10    1/10  Marciano Marquez 7 year follow up    HX GYN      d and c    HX HEENT  4/13    right leg basal cell resected    HX HYSTERECTOMY  5/26/2015    Jhony Martínez with cystocele repair and being peritoneal cyst repair    HX ORTHOPAEDIC      bunion repair bilaterally    HX ORTHOPAEDIC      left hammartoe repair    HX ORTHOPAEDIC  9/13    left shoulder surgery dr leigh Hawkins ORTHOPAEDIC  6/17/15    left 4th toe surgery    HX TONSILLECTOMY       Family History   Problem Relation Age of Onset    Heart Failure Mother     Thyroid Disease Mother     Cancer Mother         uterine ca, thyroid ca    Cancer Father         pancreatic ca age 43     Social History     Tobacco Use    Smoking status: Never Smoker    Smokeless tobacco: Never Used   Substance Use Topics    Alcohol use: Yes     Alcohol/week: 3.3 standard drinks     Types: 4 Glasses of wine per week     Comment: socially        ROS:  Feeling well. No dyspnea or chest pain on exertion. No abdominal pain, change in bowel habits, black or bloody stools. No urinary tract symptoms. GYN ROS: no breast pain or new or enlarging lumps on self exam, no vaginal bleeding, no discharge or pelvic pain. No neurological complaints. Objective:     Visit Vitals  /67 (BP 1 Location: Left arm, BP Patient Position: Sitting)   Pulse 64   Temp 97.7 °F (36.5 °C) (Oral)   Resp 16   Ht 5' 1\" (1.549 m)   Wt 104 lb (47.2 kg)   SpO2 97%   BMI 19.65 kg/m²     The patient appears well, alert, oriented x 3, in no distress. ENT normal.  Neck supple. No adenopathy or thyromegaly. JAY. Lungs are clear, good air entry, no wheezes, rhonchi or rales. S1 and S2 normal, no murmurs, regular rate and rhythm. Abdomen soft without tenderness, guarding, mass or organomegaly. Extremities show no edema, normal peripheral pulses. Neurological is normal, no focal findings.     BREAST EXAM: breasts appear normal, no suspicious masses, no skin or nipple changes or axillary nodes    PELVIC EXAM: VULVA: normal appearing vulva with no masses, tenderness or lesions, VAGINA: normal appearing vagina with normal color and discharge, no lesions, CERVIX: surgically absent, UTERUS: surgically absent, vaginal cuff well healed, PAP: Pap smear done today, thin-prep method    Assessment/Plan:   well woman  mammogram  pap smear  Diagnoses and all orders for this visit:    1. Medicare annual wellness visit, subsequent    2. Urinary pain-not today but earlier in week no ongoing sxs  -     AMB POC URINALYSIS DIP STICK MANUAL W/O MICRO  -     CULTURE, URINE    3. Hypothyroidism due to acquired atrophy of thyroid-cont 75 mcg dose as tsh was 2.3    4. GYN exam for high-risk Medicare patient  -     PAP (IMAGE GUIDED), LIQUID-BASED    5. Postmenopausal    6. S/P partial hysterectomy    7. Acquired hypothyroidism  -     LEVOXYL 75 mcg tablet; TAKE 1 TABLET DAILY BEFORE BREAKFAST    8. Encounter for immunization  -     INFLUENZA VACCINE INACTIVATED (IIV), SUBUNIT, ADJUVANTED, IM  -     ADMIN INFLUENZA VIRUS VAC    . This is the Subsequent Medicare Annual Wellness Exam, performed 12 months or more after the Initial AWV or the last Subsequent AWV    I have reviewed the patient's medical history in detail and updated the computerized patient record.      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    Skin cancer     Squamous cell carcinoma of lower leg 12/18/12    treated with Mohs surgery    Sun-damaged skin     Sunburn, blistering       Past Surgical History:   Procedure Laterality Date    AMB POC MOHS 1 STAGE T/A/L  12/18/12    squamous cell carcinoma-right lower lateral leg    BREAST SURGERY PROCEDURE UNLISTED      breast cyst    ENDOSCOPY, COLON, DIAGNOSTIC  2/2/10    1/10 Dr José Miguel Lara 7 year follow up    HX GYN      d and c    HX HEENT  4/13    right leg basal cell resected    HX HYSTERECTOMY  5/26/2015    Hu Starr with cystocele repair and being peritoneal cyst repair    HX ORTHOPAEDIC      bunion repair bilaterally    HX ORTHOPAEDIC      left hammartoe repair    HX ORTHOPAEDIC  9/13    left shoulder surgery dr leigh Cook Body ORTHOPAEDIC  6/17/15    left 4th toe surgery    HX TONSILLECTOMY       Current Outpatient Medications   Medication Sig Dispense Refill    LEVOXYL 75 mcg tablet TAKE 1 TABLET DAILY BEFORE BREAKFAST 90 Tab 3    zolpidem (AMBIEN) 5 mg tablet take 1 tablet by mouth NIGHTLY if needed for sleep 25 Tab 2    norethindrone acetate-ethinyl estradiol (FYAVOLV) 1-5 mg-mcg tab TAKE 1 TABLET DAILY 84 Tab 3    multivitamin (ONE A DAY) tablet Take 1 Tab by mouth daily.  aspirin 81 mg tablet Take 81 mg by mouth.  ERGOCALCIFEROL, VITAMIN D2, (VITAMIN D PO) Take 1,000 mg by mouth daily.  MSRWEQJF-[id number]-XW GLYCN-C PO take  by mouth.  CALTRATE 600 PO take 600 mg by mouth daily. No Known Allergies  Family History   Problem Relation Age of Onset    Heart Failure Mother     Thyroid Disease Mother     Cancer Mother         uterine ca, thyroid ca    Cancer Father         pancreatic ca age 43     Social History     Tobacco Use    Smoking status: Never Smoker    Smokeless tobacco: Never Used   Substance Use Topics    Alcohol use:  Yes     Alcohol/week: 3.3 standard drinks     Types: 4 Glasses of wine per week     Comment: socially     Patient Active Problem List   Diagnosis Code    Fibrocystic breast N60.19    Keratitis sicca, bilateral H16.223    Cancer of skin, squamous cell C44.92    Unspecified hypothyroidism E03.9    Osteoporosis M81.0    Advanced care planning/counseling discussion Z70.80    S/P partial hysterectomy Z90.711       Depression Risk Factor Screening:     3 most recent PHQ Screens 10/16/2019   Little interest or pleasure in doing things Not at all   Feeling down, depressed, irritable, or hopeless Not at all   Total Score PHQ 2 0     Alcohol Risk Factor Screening: You do not drink alcohol or very rarely. Functional Ability and Level of Safety:   Hearing Loss  Hearing is good. Activities of Daily Living  The home contains: no safety equipment. Patient does total self care    Fall Risk  Fall Risk Assessment, last 12 mths 10/16/2019   Able to walk? Yes   Fall in past 12 months? No       Abuse Screen  Patient is not abused    Cognitive Screening   Evaluation of Cognitive Function:  Has your family/caregiver stated any concerns about your memory: no  Normal    Patient Care Team   Patient Care Team:  Bonnell Hodgkin, MD as PCP - General    Assessment/Plan   Education and counseling provided:  Are appropriate based on today's review and evaluation  Pneumococcal Vaccine  Influenza Vaccine  Screening Mammography  Colorectal cancer screening tests  Bone mass measurement (DEXA)    Diagnoses and all orders for this visit:    1. Medicare annual wellness visit, subsequent    2. Urinary pain  -     AMB POC URINALYSIS DIP STICK MANUAL W/O MICRO  -     CULTURE, URINE    3. Hypothyroidism due to acquired atrophy of thyroid    4. GYN exam for high-risk Medicare patient  -     PAP (IMAGE GUIDED), LIQUID-BASED    5. Postmenopausal    6. S/P partial hysterectomy    7. Acquired hypothyroidism  -     LEVOXYL 75 mcg tablet; TAKE 1 TABLET DAILY BEFORE BREAKFAST    8.  Encounter for immunization  -     INFLUENZA VACCINE INACTIVATED (IIV), SUBUNIT, ADJUVANTED, IM  -     ADMIN INFLUENZA VIRUS VAC        Health Maintenance Due   Topic Date Due    Pneumococcal 65+ years (2 of 2 - PPSV23) 10/05/2016    GLAUCOMA SCREENING Q2Y  11/08/2018    MEDICARE YEARLY EXAM  10/16/2019    COLONOSCOPY  02/02/2020

## 2019-10-18 LAB — BACTERIA UR CULT: ABNORMAL

## 2019-10-19 ENCOUNTER — TELEPHONE (OUTPATIENT)
Dept: INTERNAL MEDICINE CLINIC | Age: 79
End: 2019-10-19

## 2019-10-19 DIAGNOSIS — N39.0 URINARY TRACT INFECTION WITHOUT HEMATURIA, SITE UNSPECIFIED: Primary | ICD-10-CM

## 2019-10-19 RX ORDER — SULFAMETHOXAZOLE AND TRIMETHOPRIM 800; 160 MG/1; MG/1
1 TABLET ORAL 2 TIMES DAILY
Qty: 10 TAB | Refills: 0 | Status: SHIPPED | OUTPATIENT
Start: 2019-10-19 | End: 2020-03-02 | Stop reason: ALTCHOICE

## 2019-11-26 ENCOUNTER — TELEPHONE (OUTPATIENT)
Dept: INTERNAL MEDICINE CLINIC | Age: 79
End: 2019-11-26

## 2019-11-26 DIAGNOSIS — M26.609 TEMPOROMANDIBULAR JOINT DISORDER: Primary | ICD-10-CM

## 2019-11-26 NOTE — TELEPHONE ENCOUNTER
----- Message from Paige Alonzo sent at 11/26/2019 10:36 AM EST -----  Regarding: Dr. Radhika Griffin telephone  Contact: [phone]  Pt requesting physical therapy orders for TMD. Please send to [facility] at fax number [phone]. Please advise pt when sent.

## 2019-12-09 ENCOUNTER — TELEPHONE (OUTPATIENT)
Dept: INTERNAL MEDICINE CLINIC | Age: 79
End: 2019-12-09

## 2019-12-10 NOTE — TELEPHONE ENCOUNTER
Pt will go to urgent care it is to hard for her to get to the office as she is going to PT in opposite direction

## 2019-12-10 NOTE — TELEPHONE ENCOUNTER
PCP advises an appt is needed to address this. Please assist with appt. Can see the NP for this issue.

## 2019-12-31 DIAGNOSIS — E89.41 HOT FLASHES DUE TO SURGICAL MENOPAUSE: ICD-10-CM

## 2019-12-31 RX ORDER — NORETHINDRONE ACETATE AND ETHINYL ESTRADIOL 1; 5 MG/1; UG/1
TABLET ORAL
Qty: 84 TAB | Refills: 3 | Status: SHIPPED | OUTPATIENT
Start: 2019-12-31 | End: 2020-01-14 | Stop reason: SDUPTHER

## 2020-01-09 ENCOUNTER — TELEPHONE (OUTPATIENT)
Dept: INTERNAL MEDICINE CLINIC | Age: 80
End: 2020-01-09

## 2020-01-09 NOTE — TELEPHONE ENCOUNTER
Pt would like a return call regarding a refill. She states the rx requested was not received. Please call her verified home number.     Thank you

## 2020-01-09 NOTE — TELEPHONE ENCOUNTER
Patient calling to check the status of her FYAVOLV prescription that was to be mailed to her. Explained her script was placed in the mail on Monday Jan 6th but due to the office delayed mailing process advised she may not receive her script until next week sometime. Patient voiced understanding.

## 2020-01-14 ENCOUNTER — TELEPHONE (OUTPATIENT)
Dept: INTERNAL MEDICINE CLINIC | Age: 80
End: 2020-01-14

## 2020-01-14 DIAGNOSIS — E89.41 HOT FLASHES DUE TO SURGICAL MENOPAUSE: ICD-10-CM

## 2020-01-14 RX ORDER — NORETHINDRONE ACETATE AND ETHINYL ESTRADIOL 1; 5 MG/1; UG/1
TABLET ORAL
Qty: 84 TAB | Refills: 3 | Status: SHIPPED | OUTPATIENT
Start: 2020-01-14 | End: 2020-01-15

## 2020-01-14 NOTE — TELEPHONE ENCOUNTER
Dr. Nava Lieberman Telephone   Received: Today   Message Contents   Anatoliy Silviano sent to [facility]  Phone Number: [phone]         Caller's first and last name:     Reason for call: to speak with Dr. Jodi Martinez required yes/no and why: Yes - to speak with Bre Nichols contact number(s): [phone] home [phone] cell     Details to clarify the request: Pt requesting a call in regard to a prescription that was sent to her in the mail.

## 2020-01-14 NOTE — TELEPHONE ENCOUNTER
Patient received her paper Rx for her Adonay Mouse in the mail yesterday & found that the script is more expensive if she gets it at her local pharmacy so she would like a new script sent to her mail order. She states she is destroying the paper script she has. She uses [facility] as her mail order. Rx e-scribed as requested.

## 2020-01-14 NOTE — TELEPHONE ENCOUNTER
----- Message from Nate Paige sent at 1/14/2020  4:44 PM EST -----  Regarding: Dr. Palmer [place]: [phone]  Caller's first and last name:    Reason for call: to speak with Dr. Rosi Rutherford required yes/no and why: Yes - to speak with Bre Nichols contact number(s): [phone] home [phone] cell    Details to clarify the request: Pt requesting a call in regard to a prescription that was sent to her in the mail.

## 2020-01-15 RX ORDER — NORETHINDRONE ACETATE AND ETHINYL ESTRADIOL 1; 5 MG/1; UG/1
TABLET ORAL
Qty: 84 TAB | Refills: 3 | Status: SHIPPED | OUTPATIENT
Start: 2020-01-15 | End: 2020-04-10 | Stop reason: SDUPTHER

## 2020-03-02 ENCOUNTER — HOSPITAL ENCOUNTER (OUTPATIENT)
Dept: LAB | Age: 80
Discharge: HOME | End: 2020-03-02

## 2020-03-02 ENCOUNTER — OFFICE VISIT (OUTPATIENT)
Dept: INTERNAL MEDICINE CLINIC | Age: 80
End: 2020-03-02

## 2020-03-02 ENCOUNTER — TELEPHONE (OUTPATIENT)
Dept: INTERNAL MEDICINE CLINIC | Age: 80
End: 2020-03-02

## 2020-03-02 VITALS
WEIGHT: 107 LBS | HEIGHT: 61 IN | RESPIRATION RATE: 16 BRPM | OXYGEN SATURATION: 98 % | DIASTOLIC BLOOD PRESSURE: 71 MMHG | BODY MASS INDEX: 20.2 KG/M2 | SYSTOLIC BLOOD PRESSURE: 110 MMHG | HEART RATE: 65 BPM | TEMPERATURE: 97.7 F

## 2020-03-02 DIAGNOSIS — R30.9 URINARY PAIN: Primary | ICD-10-CM

## 2020-03-02 DIAGNOSIS — R30.9 URINARY PAIN: ICD-10-CM

## 2020-03-02 DIAGNOSIS — N39.0 RECURRENT UTI: ICD-10-CM

## 2020-03-02 LAB
BILIRUB UR QL STRIP: NEGATIVE
GLUCOSE UR-MCNC: NEGATIVE MG/DL
KETONES P FAST UR STRIP-MCNC: NEGATIVE MG/DL
PH UR STRIP: 6.5 [PH] (ref 4.6–8)
PROT UR QL STRIP: NEGATIVE
SP GR UR STRIP: 1.01 (ref 1–1.03)
UA UROBILINOGEN AMB POC: NORMAL (ref 0.2–1)
URINALYSIS CLARITY POC: NORMAL
URINALYSIS COLOR POC: YELLOW
URINE BLOOD POC: NORMAL
URINE LEUKOCYTES POC: NORMAL
URINE NITRITES POC: NEGATIVE

## 2020-03-02 RX ORDER — NITROFURANTOIN 25; 75 MG/1; MG/1
100 CAPSULE ORAL 2 TIMES DAILY
Qty: 10 CAP | Refills: 0 | Status: SHIPPED | OUTPATIENT
Start: 2020-03-02 | End: 2020-03-04 | Stop reason: ALTCHOICE

## 2020-03-02 NOTE — TELEPHONE ENCOUNTER
Pt has a UTI and would like to be seen today by Dr. Inga Caballero only. Pt does not want to see anyone else and cannot come tomorrow as she has a meeting. Thanks.

## 2020-03-02 NOTE — PROGRESS NOTES
HISTORY OF PRESENT ILLNESS  Royal Bowling is a 68 y.o. female. HPI  Seen with recurrent UTI symptoms. She went to [facility] in October and was treated, does not know the antibiotic. Saw them again in January and was treated with Augmentin 875 mg. Both times felt she was improved. Notes that for the last four days she has had some urgency and mild dysuria. No flank pain, fevers, chills, nausea or vomiting. She does drink a lot of water, she limits her caffeine and really drinks herbal tea. She has not had hematuria or known kidney stones. Review of Systems   Constitutional: Negative for chills, diaphoresis, fever and malaise/fatigue. Gastrointestinal: Negative for abdominal pain, diarrhea, nausea and vomiting. Genitourinary: Positive for dysuria, frequency and urgency. Negative for flank pain and hematuria. Physical Exam  Vitals signs and nursing note reviewed. Constitutional:       Appearance: She is well-developed. HENT:      Head: Normocephalic and atraumatic. Neck:      Musculoskeletal: Normal range of motion and neck supple. Vascular: No carotid bruit. Cardiovascular:      Rate and Rhythm: Normal rate and regular rhythm. Heart sounds: Normal heart sounds, S1 normal and S2 normal. No murmur. Pulmonary:      Effort: Pulmonary effort is normal. No respiratory distress. Breath sounds: Normal breath sounds. Abdominal:      General: Bowel sounds are normal.      Palpations: Abdomen is soft. There is no mass. Tenderness: There is no abdominal tenderness. Comments: No cvat   Neurological:      Mental Status: She is alert and oriented to person, place, and time. Psychiatric:         Behavior: Behavior normal.         ASSESSMENT and PLAN  Diagnoses and all orders for this visit:    1. Urinary pain  -     AMB POC URINALYSIS DIP STICK MANUAL W/O MICRO  -     CULTURE, URINE; Future    2.  Recurrent UTI  -     nitrofurantoin, macrocrystal-monohydrate, (MACROBID) 100 mg capsule; Take 1 Cap by mouth two (2) times a day.     As this is the third episode in last 6 mo I do think she needs urogyn eval and she will see dr Angela Pickard   Discussed may consider suppressive daily abx if urogyn eval is normal

## 2020-03-04 ENCOUNTER — TELEPHONE (OUTPATIENT)
Dept: INTERNAL MEDICINE CLINIC | Age: 80
End: 2020-03-04

## 2020-03-04 LAB
BACTERIA SPEC CULT: ABNORMAL
CC UR VC: ABNORMAL
SERVICE CMNT-IMP: ABNORMAL

## 2020-03-04 RX ORDER — CIPROFLOXACIN 500 MG/1
500 TABLET ORAL 2 TIMES DAILY
Qty: 10 TAB | Refills: 0 | Status: SHIPPED | OUTPATIENT
Start: 2020-03-04 | End: 2020-10-19

## 2020-03-04 NOTE — TELEPHONE ENCOUNTER
Left a message that based on ur culture I advise changing from the macrobid to cipro which will cover this bacteria well-take it for 5 days bid call if any  questions

## 2020-04-10 DIAGNOSIS — E89.41 HOT FLASHES DUE TO SURGICAL MENOPAUSE: ICD-10-CM

## 2020-04-10 NOTE — TELEPHONE ENCOUNTER
Pt would like script mailed to her as she wants to take it to a different pharmacy. Address on file confirmed. Thanks.

## 2020-04-14 RX ORDER — NORETHINDRONE ACETATE AND ETHINYL ESTRADIOL 1; 5 MG/1; UG/1
1 TABLET ORAL DAILY
Qty: 84 TAB | Refills: 1 | Status: SHIPPED | OUTPATIENT
Start: 2020-04-14 | End: 2021-05-04

## 2020-04-23 ENCOUNTER — TELEPHONE (OUTPATIENT)
Dept: INTERNAL MEDICINE CLINIC | Age: 80
End: 2020-04-23

## 2020-04-24 NOTE — TELEPHONE ENCOUNTER
Pt provided info to  with [facility] & info to [facility] & Morgan Benoit with [facility]. Patient voiced understanding.

## 2020-04-30 ENCOUNTER — TELEPHONE (OUTPATIENT)
Dept: INTERNAL MEDICINE CLINIC | Age: 80
End: 2020-04-30

## 2020-04-30 NOTE — TELEPHONE ENCOUNTER
----- Message from Afshan Lawrence sent at 4/30/2020  3:52 PM EDT -----  Regarding: /Telephone  General Message/Vendor Calls    Caller's first and last name:  [address] [place] [address]     Reason for call:  Question about prescription     Callback required yes/no and why:  Yes, following up from the previous message sent.     Best contact number(s):  [phone]    Details to clarify the request:      Afshan Lawrence

## 2020-04-30 NOTE — TELEPHONE ENCOUNTER
Returned call to pharmacy. Per the pharmacy they said to disregard the call, they have fixed the issue.

## 2020-07-07 ENCOUNTER — HOSPITAL ENCOUNTER (OUTPATIENT)
Dept: MAMMOGRAPHY | Age: 80
Discharge: HOME | End: 2020-07-07
Attending: SURGERY
Payer: MEDICARE

## 2020-07-07 DIAGNOSIS — Z12.31 SCREENING MAMMOGRAM FOR HIGH-RISK PATIENT: ICD-10-CM

## 2020-07-07 PROCEDURE — 77063 BREAST TOMOSYNTHESIS BI: CPT

## 2020-08-11 ENCOUNTER — HOSPITAL ENCOUNTER (OUTPATIENT)
Dept: LAB | Age: 80
Discharge: HOME | End: 2020-08-11

## 2020-08-11 ENCOUNTER — OFFICE VISIT (OUTPATIENT)
Dept: INTERNAL MEDICINE CLINIC | Age: 80
End: 2020-08-11
Payer: MEDICARE

## 2020-08-11 VITALS
DIASTOLIC BLOOD PRESSURE: 70 MMHG | OXYGEN SATURATION: 98 % | HEART RATE: 60 BPM | RESPIRATION RATE: 16 BRPM | SYSTOLIC BLOOD PRESSURE: 108 MMHG | BODY MASS INDEX: 19.63 KG/M2 | HEIGHT: 61 IN | WEIGHT: 104 LBS | TEMPERATURE: 97.9 F

## 2020-08-11 DIAGNOSIS — N39.0 RECURRENT UTI: ICD-10-CM

## 2020-08-11 DIAGNOSIS — R30.0 DYSURIA: Primary | ICD-10-CM

## 2020-08-11 LAB
BILIRUB UR QL STRIP: NEGATIVE
GLUCOSE UR-MCNC: NEGATIVE MG/DL
KETONES P FAST UR STRIP-MCNC: NEGATIVE MG/DL
PH UR STRIP: 6 [PH] (ref 4.6–8)
PROT UR QL STRIP: NEGATIVE
SP GR UR STRIP: 1 (ref 1–1.03)
UA UROBILINOGEN AMB POC: NORMAL (ref 0.2–1)
URINALYSIS CLARITY POC: CLEAR
URINALYSIS COLOR POC: YELLOW
URINE BLOOD POC: NORMAL
URINE LEUKOCYTES POC: NORMAL
URINE NITRITES POC: NEGATIVE

## 2020-08-11 PROCEDURE — 81003 URINALYSIS AUTO W/O SCOPE: CPT | Performed by: INTERNAL MEDICINE

## 2020-08-11 PROCEDURE — G8420 CALC BMI NORM PARAMETERS: HCPCS | Performed by: INTERNAL MEDICINE

## 2020-08-11 PROCEDURE — 99213 OFFICE O/P EST LOW 20 MIN: CPT | Performed by: INTERNAL MEDICINE

## 2020-08-11 PROCEDURE — 1090F PRES/ABSN URINE INCON ASSESS: CPT | Performed by: INTERNAL MEDICINE

## 2020-08-11 PROCEDURE — G8536 NO DOC ELDER MAL SCRN: HCPCS | Performed by: INTERNAL MEDICINE

## 2020-08-11 PROCEDURE — G8510 SCR DEP NEG, NO PLAN REQD: HCPCS | Performed by: INTERNAL MEDICINE

## 2020-08-11 PROCEDURE — G8427 DOCREV CUR MEDS BY ELIG CLIN: HCPCS | Performed by: INTERNAL MEDICINE

## 2020-08-11 PROCEDURE — 1101F PT FALLS ASSESS-DOCD LE1/YR: CPT | Performed by: INTERNAL MEDICINE

## 2020-08-11 RX ORDER — CIPROFLOXACIN 500 MG/1
500 TABLET ORAL 2 TIMES DAILY
Qty: 10 TAB | Refills: 0 | Status: SHIPPED | OUTPATIENT
Start: 2020-08-11 | End: 2020-10-19

## 2020-08-11 RX ORDER — GLUCOSAMINE SULFATE 1500 MG
1000 POWDER IN PACKET (EA) ORAL
COMMUNITY

## 2020-08-11 NOTE — PROGRESS NOTES
HISTORY OF PRESENT ILLNESS  Mart Mary is a 68 y.o. female. HPI  Subjective: Mart Mary is a 68 y.o. female who complains of dysuria, frequency for 4 days. Patient also complains of recurrent uti and last ur cx showed only sens to cipro. Patient denies back pain. Patient does have a history of recurrent UTI. Patient does not have a history of pyelonephritis. Did see dr Rosanna He  Who advised cranberry supplements which she is doing  Review of Systems   Constitutional: Negative for chills, fever and malaise/fatigue. Gastrointestinal: Negative for abdominal pain, nausea and vomiting. Genitourinary: Positive for dysuria, frequency and urgency. Negative for flank pain and hematuria. Physical Exam  Vitals signs and nursing note reviewed. Constitutional:       Appearance: Normal appearance. Abdominal:      General: Bowel sounds are normal. There is no distension. Palpations: Abdomen is soft. There is no mass. Tenderness: There is no abdominal tenderness. There is no right CVA tenderness or left CVA tenderness. Neurological:      Mental Status: She is alert. ASSESSMENT and PLAN  Diagnoses and all orders for this visit:    1. Dysuria  -     AMB POC URINALYSIS DIP STICK AUTO W/ MICRO  -     CULTURE, URINE; Future    2. Recurrent UTI  -     ciprofloxacin HCl (CIPRO) 500 mg tablet; Take 1 Tab by mouth two (2) times a day.     Discussed risks of cipro including tendon ruptures

## 2020-08-13 LAB
BACTERIA SPEC CULT: NORMAL
CC UR VC: NORMAL
SERVICE CMNT-IMP: NORMAL

## 2020-10-01 ENCOUNTER — TELEPHONE (OUTPATIENT)
Dept: INTERNAL MEDICINE CLINIC | Age: 80
End: 2020-10-01

## 2020-10-01 NOTE — TELEPHONE ENCOUNTER
patient called to request an apt with her PCP only for a /uti concern ,  Patient was made aware her PCP is out of the office all next week  ,  is booked today and is working from home tmrw,       Declined to see another doc here, stated she will use urgent care

## 2020-10-12 ENCOUNTER — TELEPHONE (OUTPATIENT)
Dept: INTERNAL MEDICINE CLINIC | Age: 80
End: 2020-10-12

## 2020-10-19 ENCOUNTER — OFFICE VISIT (OUTPATIENT)
Dept: INTERNAL MEDICINE CLINIC | Age: 80
End: 2020-10-19
Payer: MEDICARE

## 2020-10-19 VITALS
BODY MASS INDEX: 19.83 KG/M2 | HEART RATE: 62 BPM | SYSTOLIC BLOOD PRESSURE: 118 MMHG | RESPIRATION RATE: 16 BRPM | WEIGHT: 105 LBS | HEIGHT: 61 IN | DIASTOLIC BLOOD PRESSURE: 76 MMHG | OXYGEN SATURATION: 98 %

## 2020-10-19 DIAGNOSIS — N39.0 RECURRENT UTI: ICD-10-CM

## 2020-10-19 DIAGNOSIS — Z00.00 MEDICARE ANNUAL WELLNESS VISIT, SUBSEQUENT: Primary | ICD-10-CM

## 2020-10-19 DIAGNOSIS — M35.01 KERATITIS SICCA, BILATERAL (HCC): ICD-10-CM

## 2020-10-19 DIAGNOSIS — Z78.0 POSTMENOPAUSAL: ICD-10-CM

## 2020-10-19 DIAGNOSIS — E89.41 HOT FLASHES DUE TO SURGICAL MENOPAUSE: ICD-10-CM

## 2020-10-19 DIAGNOSIS — Z23 ENCOUNTER FOR IMMUNIZATION: ICD-10-CM

## 2020-10-19 DIAGNOSIS — Z90.711 S/P PARTIAL HYSTERECTOMY: ICD-10-CM

## 2020-10-19 DIAGNOSIS — E03.4 HYPOTHYROIDISM DUE TO ACQUIRED ATROPHY OF THYROID: ICD-10-CM

## 2020-10-19 PROCEDURE — G0439 PPPS, SUBSEQ VISIT: HCPCS | Performed by: INTERNAL MEDICINE

## 2020-10-19 PROCEDURE — 90732 PPSV23 VACC 2 YRS+ SUBQ/IM: CPT

## 2020-10-19 PROCEDURE — 1090F PRES/ABSN URINE INCON ASSESS: CPT | Performed by: INTERNAL MEDICINE

## 2020-10-19 PROCEDURE — G8536 NO DOC ELDER MAL SCRN: HCPCS | Performed by: INTERNAL MEDICINE

## 2020-10-19 PROCEDURE — G8427 DOCREV CUR MEDS BY ELIG CLIN: HCPCS | Performed by: INTERNAL MEDICINE

## 2020-10-19 PROCEDURE — G8510 SCR DEP NEG, NO PLAN REQD: HCPCS | Performed by: INTERNAL MEDICINE

## 2020-10-19 PROCEDURE — 99213 OFFICE O/P EST LOW 20 MIN: CPT | Performed by: INTERNAL MEDICINE

## 2020-10-19 PROCEDURE — G8420 CALC BMI NORM PARAMETERS: HCPCS | Performed by: INTERNAL MEDICINE

## 2020-10-19 PROCEDURE — 1101F PT FALLS ASSESS-DOCD LE1/YR: CPT | Performed by: INTERNAL MEDICINE

## 2020-10-19 PROCEDURE — G0463 HOSPITAL OUTPT CLINIC VISIT: HCPCS | Performed by: INTERNAL MEDICINE

## 2020-10-20 NOTE — PROGRESS NOTES
HISTORY OF PRESENT ILLNESS  Gurpreet Barakat is a 68 y.o. female. HPI  Delaney Loza is seen for annual wellness visit, has a few concerns:  1. Hypothyroidism, on 75 mcg dose. No symptoms of hypo or hyperthyroidism. Manages constipation with daily Fibercon. 2. Recurrent UTIs. Did see Dr. Sushila Garces, who did not recommend any specific changes and found no structural concerns. She had another UTI treated at [facility] in October, was given Cefdinir, believes that it helped, however not sure that her symptoms are completely gone and would like another urine culture. 3. Preventive care. Mammogram in July. Bone density done 2018. Due for repeat. Flu shot given this September. She is very active, plays tennis, skis. She does worry about her daughter. Enjoys her grandchildren. Review of Systems   Constitutional: Negative for chills, diaphoresis, fever, malaise/fatigue and weight loss. HENT: Negative for hearing loss. Eyes: Negative for blurred vision. Respiratory: Negative for cough, shortness of breath and wheezing. Cardiovascular: Negative for chest pain, palpitations, orthopnea, leg swelling and PND. Gastrointestinal: Negative for heartburn and nausea. Genitourinary: Positive for urgency. Negative for dysuria, flank pain and hematuria. Musculoskeletal: Negative for myalgias. Neurological: Negative for dizziness and headaches. Psychiatric/Behavioral: Negative for memory loss. Physical Exam  Vitals signs and nursing note reviewed. Constitutional:       Appearance: She is well-developed. HENT:      Head: Normocephalic and atraumatic. Right Ear: There is impacted cerumen. Left Ear: There is impacted cerumen. Eyes:      Extraocular Movements: Extraocular movements intact. Conjunctiva/sclera: Conjunctivae normal.      Pupils: Pupils are equal, round, and reactive to light. Neck:      Musculoskeletal: Normal range of motion and neck supple.       Thyroid: No thyromegaly. Vascular: No carotid bruit. Cardiovascular:      Rate and Rhythm: Normal rate and regular rhythm. Heart sounds: Normal heart sounds, S1 normal and S2 normal. No murmur. Pulmonary:      Effort: Pulmonary effort is normal. No respiratory distress. Breath sounds: Normal breath sounds. No wheezing or rales. Abdominal:      General: Abdomen is flat. Bowel sounds are normal. There is no distension. Palpations: Abdomen is soft. There is no mass. Tenderness: There is no abdominal tenderness. There is no guarding. Genitourinary:     Comments: Breast exam bilaterally without masses axillary nodes or discharge. BSE reviewed     Musculoskeletal:      Right lower leg: No edema. Left lower leg: No edema. Skin:     Findings: No rash. Neurological:      Mental Status: She is alert and oriented to person, place, and time. Psychiatric:         Mood and Affect: Mood normal.         Behavior: Behavior normal.         ASSESSMENT and PLAN  Diagnoses and all orders for this visit:    1. Medicare annual wellness visit, subsequent    2. Recurrent UTI-could use suppressive keflex if still showing issues  -     CULTURE, URINE; Future  -     CBC WITH AUTOMATED DIFF; Future  -     CULTURE, URINE; Future    3. 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 Hypothyroidism due to acquired atrophy of thyroid  -     METABOLIC PANEL, COMPREHENSIVE; Future  -     LIPID PANEL; Future  -     TSH 3RD GENERATION; Future    5. S/P partial hysterectomy-no pap indicated    6. Keratitis sicca, bilateral ([address]    7. Postmenopausal  -     DEXA BONE DENSITY STUDY AXIAL; Future    8.  Encounter for immunization  -     PNEUMOCOCCAL POLYSACCHARIDE VACCINE, 23-VALENT, ADULT OR IMMUNOSUPPRESSED PT DOSE,    This is the Subsequent Medicare Annual Wellness Exam, performed 12 months or more after the Initial AWV or the last Subsequent AWV    I have reviewed the patient's medical history in detail and updated the computerized patient record. History     Patient Active Problem List   Diagnosis Code    Fibrocystic breast N60.19    Keratitis sicca, bilateral ([facility] M35.01    Cancer of skin, squamous cell C44.92    Unspecified hypothyroidism E03.9    Osteoporosis M81.0    Advanced care planning/counseling discussion Z71.89    S/P partial hysterectomy Z90.711     Past Medical History:   Diagnosis Date    Fibrocystic breast 3/21/2011    Hypothyroid 3/10/2009    Keratitis sicca, bilateral ([facility] [address] 3/21/2011    Skin cancer     Squamous cell carcinoma of lower leg 12/18/12    treated with Mohs surgery    Sun-damaged skin     Sunburn, blistering       Past Surgical History:   Procedure Laterality Date    AMB POC MOHS 1 STAGE T/A/L  12/18/12    squamous cell carcinoma-right lower lateral leg    BREAST SURGERY PROCEDURE UNLISTED      breast cyst    ENDOSCOPY, COLON, DIAGNOSTIC  2/2/10    1/10 Dr Cindy Benedict 7 year follow up    HX GYN      d and c    HX HEENT  4/13    right leg basal cell resected    HX HYSTERECTOMY  5/26/2015    Summer Cardenas with cystocele repair and being peritoneal cyst repair    HX ORTHOPAEDIC      bunion repair bilaterally    HX ORTHOPAEDIC      left hammartoe repair    HX ORTHOPAEDIC  9/13    left shoulder surgery dr abraham    HX ORTHOPAEDIC  6/17/15    left 4th toe surgery    HX TONSILLECTOMY       Current Outpatient Medications   Medication Sig Dispense Refill    cholecalciferol (VITAMIN D3) 25 mcg (1,000 unit) cap 1,000 Int'l Units.  norethindrone acetate-ethinyl estradiol (Fyavolv) 1-5 mg-mcg tab Take 1 Tab by mouth daily. 84 Tab 1    LEVOXYL 75 mcg tablet TAKE 1 TABLET DAILY BEFORE BREAKFAST 90 Tab 3    multivitamin (ONE A DAY) tablet Take 1 Tab by mouth daily.  aspirin 81 mg tablet Take 81 mg by mouth.  ERGOCALCIFEROL, VITAMIN D2, (VITAMIN D PO) Take 1,000 mg by mouth daily.  XGFKBIFS-QOLTKJDRIW-NH GLYCN-C PO take  by mouth.       CALTRATE 600 PO take 600 mg by mouth daily. No Known Allergies    Family History   Problem Relation Age of Onset    Heart Failure Mother     Thyroid Disease Mother     Cancer Mother         uterine ca, thyroid ca    Cancer Father         pancreatic ca age 43     Social History     Tobacco Use    Smoking status: Never Smoker    Smokeless tobacco: Never Used   Substance Use Topics    Alcohol use: Yes     Alcohol/week: 3.3 standard drinks     Types: 4 Glasses of wine per week     Comment: socially       Depression Risk Factor Screening:     3 most recent PHQ Screens 10/19/2020   Little interest or pleasure in doing things Not at all   Feeling down, depressed, irritable, or hopeless Not at all   Total Score PHQ 2 0       Alcohol Risk Screen   Do you average more than 1 drink per night or more than 7 drinks a week:  No    On any one occasion in the past three months have you have had more than 3 drinks containing alcohol:  No        Functional Ability and Level of Safety:   Hearing: Hearing is good. Activities of Daily Living: The home contains: no safety equipment. Patient does total self care     Ambulation: with no difficulty     Fall Risk:  Fall Risk Assessment, last 12 mths 10/19/2020   Able to walk? Yes   Fall in past 12 months? No     Abuse Screen:  Patient is not abused       Cognitive Screening   Has your family/caregiver stated any concerns about your memory: no         Patient Care Team   Patient Care Team:  Sarbjit Jones MD as PCP - General  Sarbjit Jones MD as PCP - [facility] Empaneled Provider    Assessment/Plan   Education and counseling provided:  Are appropriate based on today's review and evaluation  Pneumococcal Vaccine  Influenza Vaccine  Screening Mammography  Bone mass measurement (DEXA)  Screening for glaucoma    Diagnoses and all orders for this visit:    1. Medicare annual wellness visit, subsequent    2. Recurrent UTI  -     CULTURE, URINE; Future  -     CBC WITH AUTOMATED DIFF;  Future  - CULTURE, URINE; Future    3. 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 Hypothyroidism due to acquired atrophy of thyroid  -     METABOLIC PANEL, COMPREHENSIVE; Future  -     LIPID PANEL; Future  -     TSH 3RD GENERATION; Future    5. S/P partial hysterectomy    6. Keratitis sicca, bilateral ([address]    7. Postmenopausal  -     DEXA BONE DENSITY STUDY AXIAL; Future    8.  Encounter for immunization  -     PNEUMOCOCCAL POLYSACCHARIDE VACCINE, 23-VALENT, ADULT OR IMMUNOSUPPRESSED PT DOSE,        Health Maintenance Due   Topic Date Due    GLAUCOMA SCREENING Q2Y  11/08/2018    Flu Vaccine (1) 09/01/2020    Medicare Yearly Exam  10/16/2020

## 2020-10-20 NOTE — PATIENT INSTRUCTIONS
Medicare Wellness Visit, Female The best way to live healthy is to have a lifestyle where you eat a well-balanced diet, exercise regularly, limit alcohol use, and quit all forms of tobacco/nicotine, if applicable. Regular preventive services are another way to keep healthy. Preventive services (vaccines, screening tests, monitoring & exams) can help personalize your care plan, which helps you manage your own care. Screening tests can find health problems at the earliest stages, when they are easiest to treat. [facility] follows the current, evidence-based guidelines published by the [facility] ([facility]) when recommending preventive services for our patients. Because we follow these guidelines, sometimes recommendations change over time as research supports it. (For example, mammograms used to be recommended annually. Even though Medicare will still pay for an annual mammogram, the newer guidelines recommend a mammogram every two years for women of average risk). Of course, you and your doctor may decide to screen more often for some diseases, based on your risk and your co-morbidities (chronic disease you are already diagnosed with). Preventive services for you include: - Medicare offers their members a free annual wellness visit, which is time for you and your primary care provider to discuss and plan for your preventive service needs. Take advantage of this benefit every year! 
-All adults over the age of 72 should receive the recommended pneumonia vaccines. Current USPSTF guidelines recommend a series of two vaccines for the best pneumonia protection.  
-All adults should have a flu vaccine yearly and a tetanus vaccine every 10 years.  
-All adults age 48 and older should receive the shingles vaccines (series of two vaccines). -All adults age 38-68 who are overweight should have a diabetes screening test once every three years. -All adults born between [birth year] and 1963 should be screened once for Hepatitis C. 
-Other screening tests and preventive services for persons with diabetes include: an eye exam to screen for diabetic retinopathy, a kidney function test, a foot exam, and stricter control over your cholesterol.  
-Cardiovascular screening for adults with routine risk involves an electrocardiogram (ECG) at intervals determined by your doctor.  
-Colorectal cancer screenings should be done for adults age 54-65 with no increased risk factors for colorectal cancer. There are a number of acceptable methods of screening for this type of cancer. Each test has its own benefits and drawbacks. Discuss with your doctor what is most appropriate for you during your annual wellness visit. The different tests include: colonoscopy (considered the best screening method), a fecal occult blood test, a fecal DNA test, and sigmoidoscopy. 
 
-A bone mass density test is recommended when a woman turns 65 to screen for osteoporosis. This test is only recommended one time, as a screening. Some providers will use this same test as a disease monitoring tool if you already have osteoporosis. -Breast cancer screenings are recommended every other year for women of normal risk, age 54-69. 
-Cervical cancer screenings for women over age 72 are only recommended with certain risk factors. Here is a list of your current Health Maintenance items (your personalized list of preventive services) with a due date: 
Health Maintenance Due Topic Date Due  Glaucoma Screening   11/08/2018  Yearly Flu Vaccine (1) 09/01/2020 [address] [place] [address] Annual Well Visit  10/16/2020

## 2020-10-21 ENCOUNTER — HOSPITAL ENCOUNTER (OUTPATIENT)
Dept: LAB | Age: 80
Discharge: HOME | End: 2020-10-21
Payer: MEDICARE

## 2020-10-21 LAB
BACTERIA SPEC CULT: NORMAL
SERVICE CMNT-IMP: NORMAL

## 2020-10-21 PROCEDURE — 80053 COMPREHEN METABOLIC PANEL: CPT

## 2020-10-21 PROCEDURE — 85025 COMPLETE CBC W/AUTO DIFF WBC: CPT

## 2020-10-21 PROCEDURE — 36415 COLL VENOUS BLD VENIPUNCTURE: CPT

## 2020-10-21 PROCEDURE — 84443 ASSAY THYROID STIM HORMONE: CPT

## 2020-10-21 PROCEDURE — 80061 LIPID PANEL: CPT

## 2020-10-22 LAB
ALBUMIN SERPL-MCNC: 4 G/DL (ref 3.7–4.7)
ALBUMIN/GLOB SERPL: 1.8 {RATIO} (ref 1.2–2.2)
ALP SERPL-CCNC: 63 IU/L (ref 39–117)
ALT SERPL-CCNC: 19 IU/L (ref 0–32)
AST SERPL-CCNC: 28 IU/L (ref 0–40)
BASOPHILS # BLD AUTO: 0.1 X10E3/UL (ref 0–0.2)
BASOPHILS NFR BLD AUTO: 1 %
BILIRUB SERPL-MCNC: 0.5 MG/DL (ref 0–1.2)
BUN SERPL-MCNC: 18 MG/DL (ref 8–27)
BUN/CREAT SERPL: 24 (ref 12–28)
CALCIUM SERPL-MCNC: 9.3 MG/DL (ref 8.7–10.3)
CHLORIDE SERPL-SCNC: 108 MMOL/L (ref 96–106)
CHOLEST SERPL-MCNC: 178 MG/DL (ref 100–199)
CO2 SERPL-SCNC: 22 MMOL/L (ref 20–29)
CREAT SERPL-MCNC: 0.76 MG/DL (ref 0.57–1)
EOSINOPHIL # BLD AUTO: 0.2 X10E3/UL (ref 0–0.4)
EOSINOPHIL NFR BLD AUTO: 3 %
ERYTHROCYTE [DISTWIDTH] IN BLOOD BY AUTOMATED COUNT: 13 % (ref 11.7–15.4)
GLOBULIN SER CALC-MCNC: 2.2 G/DL (ref 1.5–4.5)
GLUCOSE SERPL-MCNC: 81 MG/DL (ref 65–99)
HCT VFR BLD AUTO: 40.4 % (ref 34–46.6)
HDLC SERPL-MCNC: 61 MG/DL
HGB BLD-MCNC: 13.9 G/DL (ref 11.1–15.9)
IMM GRANULOCYTES # BLD AUTO: 0 X10E3/UL (ref 0–0.1)
IMM GRANULOCYTES NFR BLD AUTO: 0 %
INTERPRETATION, 910389: NORMAL
LDLC SERPL CALC-MCNC: 108 MG/DL (ref 0–99)
LYMPHOCYTES # BLD AUTO: 2.6 X10E3/UL (ref 0.7–3.1)
LYMPHOCYTES NFR BLD AUTO: 37 %
MCH RBC QN AUTO: 30.4 PG (ref 26.6–33)
MCHC RBC AUTO-ENTMCNC: 34.4 G/DL (ref 31.5–35.7)
MCV RBC AUTO: 88 FL (ref 79–97)
MONOCYTES # BLD AUTO: 0.5 X10E3/UL (ref 0.1–0.9)
MONOCYTES NFR BLD AUTO: 7 %
NEUTROPHILS # BLD AUTO: 3.6 X10E3/UL (ref 1.4–7)
NEUTROPHILS NFR BLD AUTO: 52 %
PLATELET # BLD AUTO: 282 X10E3/UL (ref 150–450)
POTASSIUM SERPL-SCNC: 4.5 MMOL/L (ref 3.5–5.2)
PROT SERPL-MCNC: 6.2 G/DL (ref 6–8.5)
RBC # BLD AUTO: 4.57 X10E6/UL (ref 3.77–5.28)
SODIUM SERPL-SCNC: 142 MMOL/L (ref 134–144)
TRIGL SERPL-MCNC: 43 MG/DL (ref 0–149)
TSH SERPL DL<=0.005 MIU/L-ACNC: 1.37 UIU/ML (ref 0.45–4.5)
VLDLC SERPL CALC-MCNC: 9 MG/DL (ref 5–40)
WBC # BLD AUTO: 7 X10E3/UL (ref 3.4–10.8)

## 2020-10-27 ENCOUNTER — HOSPITAL ENCOUNTER (OUTPATIENT)
Dept: BONE DENSITY | Age: 80
Discharge: HOME | End: 2020-10-27
Attending: INTERNAL MEDICINE
Payer: MEDICARE

## 2020-10-27 ENCOUNTER — PATIENT MESSAGE (OUTPATIENT)
Dept: INTERNAL MEDICINE CLINIC | Age: 80
End: 2020-10-27

## 2020-10-27 DIAGNOSIS — Z78.0 POSTMENOPAUSAL: ICD-10-CM

## 2020-10-27 PROCEDURE — 77080 DXA BONE DENSITY AXIAL: CPT

## 2020-10-28 NOTE — TELEPHONE ENCOUNTER
From: Kami Wang MD  To: Isaias Cason  Sent: 10/27/2020 7:51 PM EDT  Subject: dexa    Good news-your bones are stable over time with no sign of progressive bone loss. Please keep up the good work.   Kami Wang MD

## 2020-10-29 ENCOUNTER — TELEPHONE (OUTPATIENT)
Dept: INTERNAL MEDICINE CLINIC | Age: 80
End: 2020-10-29

## 2020-10-29 ENCOUNTER — HOSPITAL ENCOUNTER (EMERGENCY)
Age: 80
Discharge: HOME | End: 2020-10-29
Attending: EMERGENCY MEDICINE | Admitting: EMERGENCY MEDICINE
Payer: MEDICARE

## 2020-10-29 ENCOUNTER — APPOINTMENT (OUTPATIENT)
Dept: CT IMAGING | Age: 80
End: 2020-10-29
Attending: STUDENT IN AN ORGANIZED HEALTH CARE EDUCATION/TRAINING PROGRAM
Payer: MEDICARE

## 2020-10-29 VITALS
HEIGHT: 61 IN | TEMPERATURE: 97.8 F | SYSTOLIC BLOOD PRESSURE: 101 MMHG | RESPIRATION RATE: 18 BRPM | BODY MASS INDEX: 19.94 KG/M2 | OXYGEN SATURATION: 99 % | WEIGHT: 105.6 LBS | HEART RATE: 75 BPM | DIASTOLIC BLOOD PRESSURE: 60 MMHG

## 2020-10-29 DIAGNOSIS — E03.9 HYPOTHYROIDISM, UNSPECIFIED TYPE: ICD-10-CM
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
## 2020-10-29 PROCEDURE — 99284 EMERGENCY DEPT VISIT MOD MDM: CPT

## 2020-10-29 PROCEDURE — 70450 CT HEAD/BRAIN W/O DYE: CPT

## 2020-10-29 NOTE — ED TRIAGE NOTES
TRIAGE NOTE: Patient arrived from home with c/o head injury after a fall. Patient was playing tennis Tuesday when she fell backwards and hit her head. Denies LOC.

## 2020-10-29 NOTE — DISCHARGE INSTRUCTIONS
Preventing Outdoor Falls: Care Instructions  Your Care Instructions     Worries about falls don't need to keep you indoors. Outdoor activities like walking have big benefits for your health. You will need to watch your step and learn a few safety measures. If you are worried about having a fall outdoors, ask your doctor about exercises, classes, or physical therapy that may help. You can learn ways to gain strength, flexibility, and balance. Ask if it might help to use a cane or walker. You can make your time outdoors safer with a few simple measures. Follow-up care is a key part of your treatment and safety. Be sure to make and go to all appointments, and call your doctor if you are having problems. It's also a good idea to know your test results and keep a list of the medicines you take. How can you prevent falls outdoors? · Wear shoes with firm soles and low heels. If you have to walk on an icy surface, use grippers that can be worn over your shoes in bad weather. · Be extra careful if weather is bad. Walk on the grass when the sidewalks are slick. If you live in a place that gets snow and ice in the winter, sprinkle salt on slippery stairs and sidewalks. · Be careful getting on or off buses and trains or getting in and out of cars. If handrails are available, use them. · Be careful when you cross the street. Look for crosswalks or places where curb cuts or ramps are present. · Try not to hurry, especially if you are carrying something. · Be cautious in parking lots or garages. There may be curbs or changes in pavement, or the height of the pavement may vary. · Make sure to wear the correct eyeglasses, if you need them. Reading glasses or bifocals can make it harder to see hazards that might be in your way. · If you are walking outdoors for exercise, try to:  ? Walk in well-lighted, well-maintained areas. These include high school or college tracks, shopping malls, and public spaces.   ? Walk with a partner. ? Watch out for cracked sidewalks, curbs, changes in the height of the pavement, exposed tree roots, and debris such as fallen leaves or branches. Where can you learn more? Go to http://www.rubin.com/  Enter U018 in the search box to learn more about \"Preventing Outdoor Falls: Care Instructions. \"  Current as of: April 15, 2020               Content Version: 12.6  © 7002-8831 [employer]. Care instructions adapted under license by [employer] (which disclaims liability or warranty for this information). If you have questions about a medical condition or this instruction, always ask your healthcare professional. [place] [address] any warranty or liability for your use of this information.

## 2020-10-29 NOTE — ED PROVIDER NOTES
Patient is 68year old female who presents to ED c/o ground level fall which occurred 2 days PTA. Patient reports she was playing tennis when she went to reach for a ball, falling back and hitting her head on the court. Patient reports her head \"bounced off the ground\" and immediately after she was slightly dazed. Patient reports she remembers the fall, and sat down after and did not return to playing tennis. Patient denies syncope, LOC, visual changes, CP, SOB, headache, nausea, vomiting, neurological deficits, activity change and fatigue. Patient reports she taking Aspirin 81mg per day. Patient reports concern for subdural hematoma. Patient denies prior head trauma. Past Medical History: Hypothyroidism, Squamous Cell Carcinoma of leg  Social History: Patient is retired and plays tennis frequently. Admits to alcohol use of a few glasses of wine 2-3 times per week. Nonsmoker, Denies illicit drug use.             Past Medical History:   Diagnosis Date    Fibrocystic breast 3/21/2011    Hypothyroid 3/10/2009    Keratitis sicca, bilateral ([address] 3/21/2011    Skin cancer     Squamous cell carcinoma of lower leg 12/18/12    treated with Mohs surgery    Sun-damaged skin     Sunburn, blistering        Past Surgical History:   Procedure Laterality Date    AMB POC MOHS 1 STAGE T/A/L  12/18/12    squamous cell carcinoma-right lower lateral leg    BREAST SURGERY PROCEDURE UNLISTED      breast cyst    ENDOSCOPY, COLON, DIAGNOSTIC  2/2/10    1/10 Dr Dash Born 7 year follow up    HX GYN      d and c    HX HEENT  4/13    right leg basal cell resected    HX HYSTERECTOMY  5/26/2015    Ardia Marlene with cystocele repair and being peritoneal cyst repair    HX ORTHOPAEDIC      bunion repair bilaterally    HX ORTHOPAEDIC      left hammartoe repair    HX ORTHOPAEDIC  9/13    left shoulder surgery dr leigh Brewer ORTHOPAEDIC  6/17/15    left 4th toe surgery    HX TONSILLECTOMY           Family History:   Problem Relation Age of Onset    Heart Failure Mother     Thyroid Disease Mother     Cancer Mother         uterine ca, thyroid ca    Cancer Father         pancreatic ca age 43       Social History     Socioeconomic History    Marital status:      Spouse name: Not on file    Number of children: Not on file    Years of education: Not on file    Highest education level: Not on file   Occupational History    Not on file   Social Needs    Financial resource strain: Not on file    Food insecurity     Worry: Not on file     Inability: Not on file    Transportation needs     Medical: Not on file     Non-medical: Not on file   Tobacco Use    Smoking status: Never Smoker    Smokeless tobacco: Never Used   Substance and Sexual Activity    Alcohol use: Yes     Alcohol/week: 3.3 standard drinks     Types: 4 Glasses of wine per week     Comment: socially    Drug use: No    Sexual activity: Yes     Partners: Male   Lifestyle    Physical activity     Days per week: Not on file     Minutes per session: Not on file    Stress: Not on file   Relationships    Social connections     Talks on phone: Not on file     Gets together: Not on file     Attends [employer] service: Not on file     Active member of club or organization: Not on file     Attends meetings of clubs or organizations: Not on file     Relationship status: Not on file    Intimate partner violence     Fear of current or ex partner: Not on file     Emotionally abused: Not on file     Physically abused: Not on file     Forced sexual activity: Not on file   Other Topics Concern    Not on file   Social History Narrative    Not on file         ALLERGIES: Patient has no known allergies. Review of Systems   Constitutional: Negative for activity change, chills, fatigue and fever. HENT: Negative for ear pain, facial swelling and hearing loss. Eyes: Negative for pain and discharge. Respiratory: Negative for cough and shortness of breath. Cardiovascular: Negative for chest pain. Genitourinary: Negative for dysuria and urgency. Musculoskeletal: Negative for back pain, gait problem, neck pain and neck stiffness. Skin: Negative for rash. Allergic/Immunologic: Negative for immunocompromised state. Neurological: Negative for dizziness, tremors, seizures, syncope, facial asymmetry, speech difficulty, weakness, light-headedness, numbness and headaches. Positive for fall   Psychiatric/Behavioral: Negative for behavioral problems and confusion. All other systems reviewed and are negative. There were no vitals filed for this visit. Physical Exam  Vitals signs and nursing note reviewed. Constitutional:       Appearance: Normal appearance. She is well-developed. Comments: Well appearing, pleasant, thin female   HENT:      Head: Normocephalic and atraumatic. Nose: Nose normal.      Mouth/Throat:      Mouth: Mucous membranes are moist.   Eyes:      General: Lids are normal.      Extraocular Movements: Extraocular movements intact. Conjunctiva/sclera: Conjunctivae normal.   Neck:      Musculoskeletal: Normal range of motion and neck supple. No muscular tenderness. Cardiovascular:      Rate and Rhythm: Normal rate and regular rhythm. Pulses: Normal pulses. Heart sounds: Normal heart sounds, S1 normal and S2 normal. No murmur. No friction rub. No gallop. Pulmonary:      Effort: Pulmonary effort is normal. No accessory muscle usage. Breath sounds: Normal breath sounds. No stridor. No wheezing, rhonchi or rales. Abdominal:      General: Abdomen is flat. Bowel sounds are normal.      Palpations: Abdomen is soft. Tenderness: There is no abdominal tenderness. Musculoskeletal: Normal range of motion. Skin:     General: Skin is warm and dry. Capillary Refill: Capillary refill takes less than 2 seconds. Comments: No signs of ecchymosis or edema to posterior head.     Neurological: General: No focal deficit present. Mental Status: She is alert and oriented to person, place, and time. Mental status is at baseline. Cranial Nerves: No cranial nerve deficit. Gait: Gait normal.   Psychiatric:         Attention and Perception: Attention normal.         Mood and Affect: Mood and affect normal.         Speech: Speech normal.         Behavior: Behavior normal. Behavior is cooperative. Thought Content: Thought content normal.         Cognition and Memory: Cognition normal.         Judgment: Judgment normal.          MDM  Number of Diagnoses or Management Options  Diagnosis management comments: Patient is a 68year old female who presents with concerns of subdural hematoma secondary to recent GLF fall 2 days ago. Patient tripped and fell, hitting the posterior part of her head on the ground. Patient reports after the fall she felt slightly dazed and stopped playing tennis. Denies syncope, LOC, headaches, visual changes, fatigue, nausea, vomiting, seizures, and neurological deficits. Vital signs are stable. Exam is benign. Will order CT head w/o contrast to rule out subdural     [place] CT Head Injury/Trauma Rule from [email or web address]  on 10/29/2020  ** All calculations should be rechecked by clinician prior to use **    RESULT SUMMARY:  Consider CT    The [place] Head CT Rule cannot rule out need for imaging. Consider CT.       INPUTS:  Age  -> 0 = No  Patient on blood thinners -> 0 = No  Seizure after injury -> 0 =  No  GCS  -> 0 = No  Suspected open or depressed skull fracture -> 0 = No  Any sign of basilar skull fracture? -> 0 = No  =2 episodes of vomiting -> 0 = No  Age =65 years -> 1 = Yes  Retrograde amnesia to the event = 30 minutes -> 0 = No  \"Dangerous\" mechanism? -> 0 = No         Amount and/or Complexity of Data Reviewed  Tests in the radiology section of CPT®: ordered and reviewed  Discuss the patient with other providers: yes (Dr. Julio Barrera, ER attending) Procedures               Signed By: Bobbi Mcardle, MD     October 30, 2020

## 2020-11-11 DIAGNOSIS — E03.9 ACQUIRED HYPOTHYROIDISM: ICD-10-CM

## 2020-11-11 RX ORDER — LEVOTHYROXINE SODIUM 75 UG/1
TABLET ORAL
Qty: 90 TAB | Refills: 3 | Status: SHIPPED | OUTPATIENT
Start: 2020-11-11 | End: 2021-12-05

## 2020-11-17 ENCOUNTER — TELEPHONE (OUTPATIENT)
Dept: INTERNAL MEDICINE CLINIC | Age: 80
End: 2020-11-17

## 2020-11-17 NOTE — TELEPHONE ENCOUNTER
Refill sent on 11/11/20 to mail order on file. Notified patient [facility] requested the refill. Patient stated she will follow up with her pharmacy today. Patient was thankful for the return call.

## 2020-11-17 NOTE — TELEPHONE ENCOUNTER
----- Message from Giovani Lindsey sent at 11/16/2020  3:47 PM EST -----  Regarding: Dr. Tony Wallis refill  Medication Refill    Caller (if not patient): pt       Relationship of caller (if not patient): pt       Best contact number(s): [phone]      Name of medication and dosage if known: LevoxyL       Is patient out of this medication (yes/no): no       Pharmacy name: [facility] mail order (on file)    Pharmacy listed in chart? (yes/no): yes   Pharmacy phone number: n.a       Details to clarify the request: pt is requesting a call to confirm it's been called into pharmacy.        Giovani Lindsey

## 2020-12-09 ENCOUNTER — OFFICE VISIT (OUTPATIENT)
Dept: INTERNAL MEDICINE CLINIC | Age: 80
End: 2020-12-09
Payer: MEDICARE

## 2020-12-09 VITALS
TEMPERATURE: 97.7 F | OXYGEN SATURATION: 98 % | DIASTOLIC BLOOD PRESSURE: 78 MMHG | HEIGHT: 61 IN | HEART RATE: 68 BPM | RESPIRATION RATE: 16 BRPM | SYSTOLIC BLOOD PRESSURE: 127 MMHG | WEIGHT: 105 LBS | BODY MASS INDEX: 19.83 KG/M2

## 2020-12-09 DIAGNOSIS — N30.01 ACUTE CYSTITIS WITH HEMATURIA: Primary | ICD-10-CM

## 2020-12-09 LAB
BILIRUB UR QL STRIP: NEGATIVE
GLUCOSE UR-MCNC: NEGATIVE MG/DL
KETONES P FAST UR STRIP-MCNC: NEGATIVE MG/DL
PH UR STRIP: 7 [PH] (ref 4.6–8)
PROT UR QL STRIP: NEGATIVE
SP GR UR STRIP: 1.01 (ref 1–1.03)
UA UROBILINOGEN AMB POC: NORMAL (ref 0.2–1)
URINALYSIS CLARITY POC: CLEAR
URINALYSIS COLOR POC: NORMAL
URINE BLOOD POC: NORMAL
URINE LEUKOCYTES POC: NORMAL
URINE NITRITES POC: NEGATIVE

## 2020-12-09 PROCEDURE — G8427 DOCREV CUR MEDS BY ELIG CLIN: HCPCS | Performed by: INTERNAL MEDICINE

## 2020-12-09 PROCEDURE — 81003 URINALYSIS AUTO W/O SCOPE: CPT | Performed by: INTERNAL MEDICINE

## 2020-12-09 PROCEDURE — G8420 CALC BMI NORM PARAMETERS: HCPCS | Performed by: INTERNAL MEDICINE

## 2020-12-09 PROCEDURE — 99213 OFFICE O/P EST LOW 20 MIN: CPT | Performed by: INTERNAL MEDICINE

## 2020-12-09 PROCEDURE — G0463 HOSPITAL OUTPT CLINIC VISIT: HCPCS | Performed by: INTERNAL MEDICINE

## 2020-12-09 PROCEDURE — G8432 DEP SCR NOT DOC, RNG: HCPCS | Performed by: INTERNAL MEDICINE

## 2020-12-09 PROCEDURE — 1101F PT FALLS ASSESS-DOCD LE1/YR: CPT | Performed by: INTERNAL MEDICINE

## 2020-12-09 PROCEDURE — G8536 NO DOC ELDER MAL SCRN: HCPCS | Performed by: INTERNAL MEDICINE

## 2020-12-09 PROCEDURE — 1090F PRES/ABSN URINE INCON ASSESS: CPT | Performed by: INTERNAL MEDICINE

## 2020-12-09 RX ORDER — ESTRADIOL 0.1 MG/G
CREAM VAGINAL
Qty: 42.5 G | Refills: 1 | Status: SHIPPED | OUTPATIENT
Start: 2020-12-09 | End: 2021-12-21 | Stop reason: SDUPTHER

## 2020-12-09 RX ORDER — CEFDINIR 300 MG/1
300 CAPSULE ORAL 2 TIMES DAILY
Qty: 14 CAP | Refills: 0 | Status: SHIPPED | OUTPATIENT
Start: 2020-12-09 | End: 2020-12-16

## 2020-12-09 NOTE — PROGRESS NOTES
Assessment and Plan   Diagnoses and all orders for this visit:    1. Acute cystitis with hematuria  -     AMB POC URINALYSIS DIP STICK AUTO W/O MICRO  -     cefdinir (OMNICEF) 300 mg capsule; Take 1 Cap by mouth two (2) times a day for 7 days. -     estradioL (ESTRACE) 0.01 % (0.1 mg/gram) vaginal cream; Administer intravaginally and externally around urethra once a day for a week then administer twice weekly  -     CULTURE, URINE; Future  Presents to clinic with increased frequency and dysuria since this morning. Has a history of recurrent UTIs and has had several UTIs this year. Was previously seen by urology who did not recommend any further work-up. Denies any fever, chills, abdominal pain, nausea, vomiting, back pain. Previous urine cultures resistant or inconclusive to Bactrim and Macrobid. We will do cefdinir. We will also send for culture. Patient was interested in talking about estradiol cream to help decrease UTIs. She would like her PCPs opinion on this. Prescription sent to pharmacy. Benefits, risks, possible drug interactions, and side effects of all new medications were reviewed with the patient. Pt verbalized understanding. Return to clinic: As needed    Shiv Helm MD  [facility]  12/9/2020    No future appointments. Subjective   Chief Complaint   Dysuria    Eveline Moncada is a 68 y.o. female         Review of Systems   Constitutional: Negative for chills and fever. Respiratory: Negative for shortness of breath. Cardiovascular: Negative for chest pain. Objective   Vitals:       Visit Vitals  /78 (BP 1 Location: Left arm, BP Patient Position: Sitting)   Pulse 68   Temp 97.7 °F (36.5 °C) (Oral)   Resp 16   Ht 5' 1\" (1.549 m)   Wt 105 lb (47.6 kg)   SpO2 98%   BMI 19.84 kg/m²        Physical Exam  Constitutional:       Appearance: Normal appearance. She is not ill-appearing.    Cardiovascular:      Rate and Rhythm: Normal rate. Pulmonary:      Effort: No respiratory distress. Neurological:      Mental Status: She is alert. Gait: Gait normal.   Psychiatric:         Mood and Affect: Mood normal.         Thought Content: Thought content normal.         Judgment: Judgment normal.          Current Outpatient Medications   Medication Sig    cranberry extract 450 mg tab tablet Take 450 mg by mouth.  cefdinir (OMNICEF) 300 mg capsule Take 1 Cap by mouth two (2) times a day for 7 days.  estradioL (ESTRACE) 0.01 % (0.1 mg/gram) vaginal cream Administer intravaginally and externally around urethra once a day for a week then administer twice weekly    LevoxyL 75 mcg tablet TAKE 1 TABLET DAILY BEFORE BREAKFAST    cholecalciferol (VITAMIN D3) 25 mcg (1,000 unit) cap 1,000 Int'l Units.  multivitamin (ONE A DAY) tablet Take 1 Tab by mouth daily.  aspirin 81 mg tablet Take 81 mg by mouth.  OLCBWGTP-BGXWYOJRTP-NI GLYCN-C PO take  by mouth.  CALTRATE 600 PO take 600 mg by mouth daily.  norethindrone acetate-ethinyl estradiol (Fyavolv) 1-5 mg-mcg tab Take 1 Tab by mouth daily.  ERGOCALCIFEROL, VITAMIN D2, (VITAMIN D PO) Take 1,000 mg by mouth daily. No current facility-administered medications for this visit.

## 2020-12-09 NOTE — Clinical Note
Hi! Pt wanted me to send you the note and let you know she has another UTI. She brought up the possibility of starting estrogen cream for prophylaxis. We discussed it, and I sent a prescription, but she wants your opinion, so I told her I'd let you know. Thanks!

## 2020-12-09 NOTE — PROGRESS NOTES
Chief Complaint   Patient presents with    Urinary Frequency    Urinary Pain     Patient was seen at [facility] for a UTI  10/2020. Visit Vitals  /78 (BP 1 Location: Left arm, BP Patient Position: Sitting)   Pulse 68   Temp 97.7 °F (36.5 °C) (Oral)   Resp 16   Ht 5' 1\" (1.549 m)   Wt 105 lb (47.6 kg)   SpO2 98%   BMI 19.84 kg/m²     1. Have you been to the ER, urgent care clinic since your last visit? Hospitalized since your last visit? [facility]    2. Have you seen or consulted any other health care providers outside of the [address] [place] [address] since your last visit? Include any pap smears or colon screening.  no

## 2020-12-10 LAB
BACTERIA SPEC CULT: NORMAL
SERVICE CMNT-IMP: NORMAL

## 2020-12-28 ENCOUNTER — TELEPHONE (OUTPATIENT)
Dept: INTERNAL MEDICINE CLINIC | Age: 80
End: 2020-12-28

## 2020-12-28 NOTE — TELEPHONE ENCOUNTER
Patient advised per PCP to cont the Estrace cream twice a week for preventative measures. Patient agrees & will continue the cream as prescribed. Patient did contact the [facility] med she went to recently & requested her records be faxed to the office. Patient had no further questions or concerns.

## 2020-12-28 NOTE — TELEPHONE ENCOUNTER
Patient reports earlier this month she began having urinary s/s & saw Ganga Woodward for eval. She was given rx for estrace cream to use. Her cx from 12/9 did not grow anything. She went to [facility] med on Tues last week for urinary s/s again & was given Rx for Macrobid. She is unsure of culture results. Patient denies having urinary s/s at this time but would like PCP's opinion on the recurrent symptoms & use of the estrace cream. Patient was offered a virtual appt but declined. Will make one only if necessary.

## 2020-12-28 NOTE — TELEPHONE ENCOUNTER
----- Message from Chika Gibson sent at 12/28/2020 12:30 PM EST -----  Regarding: Dr. Sav Stone Message/Vendor Calls    Caller's first and last name:      Reason for call: Dominique Aden required yes/no and why: yes       Best contact number(s): [phone]      Details to clarify the request: Patient would like a callback from the nurse       Chika Gibson

## 2021-01-26 ENCOUNTER — TELEPHONE (OUTPATIENT)
Dept: INTERNAL MEDICINE CLINIC | Age: 81
End: 2021-01-26

## 2021-01-26 NOTE — TELEPHONE ENCOUNTER
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  is scheduled to receive her 1st covid 19 vaccine tomorrow but she questions if her  whom is not a [facility] patient can be scheduled. Advised only [facility] patients can receive the vaccine thru [facility] at this time. Mrs. Lila Barragan voiced understanding.

## 2021-01-26 NOTE — TELEPHONE ENCOUNTER
----- Message from Mallorie Ramos sent at 1/26/2021  1:11 PM EST -----  Regarding: Dr. Toña Fajardo first and last name: n/a  Reason for call: can sp get vaccine  Callback required yes/no and why:yes  Best contact number(s): [phone]  Details to clarify the request:Sherry requesting special permission for sp, [facility] patient get appt for vaccine

## 2021-01-27 ENCOUNTER — IMMUNIZATION (OUTPATIENT)
Dept: INTERNAL MEDICINE CLINIC | Age: 81
End: 2021-01-27
Payer: MEDICARE

## 2021-01-27 DIAGNOSIS — Z23 ENCOUNTER FOR IMMUNIZATION: Primary | ICD-10-CM

## 2021-01-27 PROCEDURE — 91301 COVID-19, MRNA, LNP-S, PF, 100MCG/0.5ML DOSE(MODERNA): CPT | Performed by: FAMILY MEDICINE

## 2021-01-27 PROCEDURE — 0011A PR IMM ADMN SARSCOV2 100 MCG/0.5 ML 1ST DOSE: CPT | Performed by: FAMILY MEDICINE

## 2021-02-24 ENCOUNTER — IMMUNIZATION (OUTPATIENT)
Dept: INTERNAL MEDICINE CLINIC | Age: 81
End: 2021-02-24
Payer: MEDICARE

## 2021-02-24 DIAGNOSIS — Z23 ENCOUNTER FOR IMMUNIZATION: Primary | ICD-10-CM

## 2021-02-24 PROCEDURE — 91301 COVID-19, MRNA, LNP-S, PF, 100MCG/0.5ML DOSE(MODERNA): CPT | Performed by: FAMILY MEDICINE

## 2021-02-24 PROCEDURE — 0012A COVID-19, MRNA, LNP-S, PF, 100MCG/0.5ML DOSE(MODERNA): CPT | Performed by: FAMILY MEDICINE

## 2021-05-03 DIAGNOSIS — E89.41 HOT FLASHES DUE TO SURGICAL MENOPAUSE: ICD-10-CM

## 2021-05-04 RX ORDER — NORETHINDRONE ACETATE AND ETHINYL ESTRADIOL 1; 5 MG/1; UG/1
TABLET ORAL
Qty: 84 TAB | Refills: 1 | Status: SHIPPED | OUTPATIENT
Start: 2021-05-04

## 2021-06-17 ENCOUNTER — TRANSCRIBE ORDER (OUTPATIENT)
Dept: SCHEDULING | Age: 81
End: 2021-06-17

## 2021-06-17 DIAGNOSIS — Z12.31 VISIT FOR SCREENING MAMMOGRAM: Primary | ICD-10-CM

## 2021-08-02 ENCOUNTER — HOSPITAL ENCOUNTER (OUTPATIENT)
Dept: MAMMOGRAPHY | Age: 81
Discharge: HOME | End: 2021-08-02
Attending: SURGERY
Payer: MEDICARE

## 2021-08-02 DIAGNOSIS — Z12.31 VISIT FOR SCREENING MAMMOGRAM: ICD-10-CM

## 2021-08-02 PROCEDURE — 77063 BREAST TOMOSYNTHESIS BI: CPT

## 2021-08-17 ENCOUNTER — TELEPHONE (OUTPATIENT)
Dept: INTERNAL MEDICINE CLINIC | Age: 81
End: 2021-08-17

## 2021-08-17 NOTE — TELEPHONE ENCOUNTER
Patient is requesting to speak with the nurse she is unhappy that it will take over a week before she can see her doctor. States she has medical need that she needs to discuss with her PCP only, asking for the nurse to call her.           She can be reached at [phone]

## 2021-08-17 NOTE — TELEPHONE ENCOUNTER
Returned call to patient. She is unsure if she is having an allergic reaction but her foot is very swollen & warm to the touch & she has 3 spots, possibly hives, on her back. She noted this happened a while ago & she could not also get an appt with her PCP then so she went to urgent care. She is upset the MD does not have any room for sick patients anymore & questions if she needs to change doctors. Informed patient the MD has been out of office quite a bit the last few weeks & lots of patients have been rescheduled which is why the schedules are so full. Patient scheduled for Tues Aug 24th at 3:00. She will go to urgent care if necessary.

## 2021-08-24 ENCOUNTER — OFFICE VISIT (OUTPATIENT)
Dept: INTERNAL MEDICINE CLINIC | Age: 81
End: 2021-08-24
Payer: MEDICARE

## 2021-08-24 VITALS
HEART RATE: 69 BPM | HEIGHT: 61 IN | DIASTOLIC BLOOD PRESSURE: 69 MMHG | WEIGHT: 105 LBS | BODY MASS INDEX: 19.83 KG/M2 | SYSTOLIC BLOOD PRESSURE: 103 MMHG | OXYGEN SATURATION: 95 % | RESPIRATION RATE: 13 BRPM | TEMPERATURE: 98.1 F

## 2021-08-24 DIAGNOSIS — M35.01 KERATITIS SICCA, BILATERAL (HCC): ICD-10-CM

## 2021-08-24 DIAGNOSIS — M79.89 FOOT SWELLING: Primary | ICD-10-CM

## 2021-08-24 DIAGNOSIS — H61.22 EXCESSIVE EAR WAX, LEFT: ICD-10-CM

## 2021-08-24 DIAGNOSIS — H61.23 BILATERAL IMPACTED CERUMEN: ICD-10-CM

## 2021-08-24 PROCEDURE — 1090F PRES/ABSN URINE INCON ASSESS: CPT | Performed by: INTERNAL MEDICINE

## 2021-08-24 PROCEDURE — 99213 OFFICE O/P EST LOW 20 MIN: CPT | Performed by: INTERNAL MEDICINE

## 2021-08-24 PROCEDURE — G8510 SCR DEP NEG, NO PLAN REQD: HCPCS | Performed by: INTERNAL MEDICINE

## 2021-08-24 PROCEDURE — G8420 CALC BMI NORM PARAMETERS: HCPCS | Performed by: INTERNAL MEDICINE

## 2021-08-24 PROCEDURE — 1101F PT FALLS ASSESS-DOCD LE1/YR: CPT | Performed by: INTERNAL MEDICINE

## 2021-08-24 PROCEDURE — G8536 NO DOC ELDER MAL SCRN: HCPCS | Performed by: INTERNAL MEDICINE

## 2021-08-24 PROCEDURE — 69210 REMOVE IMPACTED EAR WAX UNI: CPT | Performed by: INTERNAL MEDICINE

## 2021-08-24 PROCEDURE — G0463 HOSPITAL OUTPT CLINIC VISIT: HCPCS | Performed by: INTERNAL MEDICINE

## 2021-08-24 PROCEDURE — 69209 REMOVE IMPACTED EAR WAX UNI: CPT | Performed by: INTERNAL MEDICINE

## 2021-08-24 PROCEDURE — G8427 DOCREV CUR MEDS BY ELIG CLIN: HCPCS | Performed by: INTERNAL MEDICINE

## 2021-08-24 RX ORDER — CLOBETASOL PROPIONATE 0.5 MG/G
OINTMENT TOPICAL
COMMUNITY
Start: 2021-06-03 | End: 2021-11-11 | Stop reason: ALTCHOICE

## 2021-08-24 RX ORDER — CEPHALEXIN 500 MG/1
CAPSULE ORAL
COMMUNITY
Start: 2021-06-21 | End: 2021-08-24 | Stop reason: ALTCHOICE

## 2021-08-24 RX ORDER — TRAMADOL HYDROCHLORIDE 50 MG/1
TABLET ORAL
COMMUNITY
Start: 2021-06-21 | End: 2021-08-24 | Stop reason: ALTCHOICE

## 2021-08-24 RX ORDER — CEFUROXIME AXETIL 250 MG/1
TABLET ORAL
COMMUNITY
End: 2021-08-24 | Stop reason: ALTCHOICE

## 2021-08-24 NOTE — PROGRESS NOTES
HISTORY OF PRESENT ILLNESS  Christi Frasre is a 80 y.o. female. [facility]  Christoph Commander comes in for a few issues. A week and a half ago she was stung by yellow jackets on her foot. Her foot got terribly swollen. She did not have shortness of breath, wheezing or mucosal swelling. She used Benadryl, which caused a paradoxical reaction, but it did all resolve. We discussed that this is not indication for EpiPen, but that certainly she could use Benadryl and Allegra in the future if she had localized reaction. She is having a little fullness in her ears and dizziness and would like wax removal.  Wants to discuss getting a COVID booster, is due in October. Wants my opinion on getting it before October. Discussed that the current guidelines are eight months out from initial Maribel Sullivan shot. Review of Systems   Constitutional: Negative for chills, fever and malaise/fatigue. HENT: Positive for hearing loss. Respiratory: Negative for cough, shortness of breath and wheezing. Neurological: Positive for dizziness. Physical Exam  Vitals and nursing note reviewed. Constitutional:       Appearance: She is well-developed. HENT:      Head: Normocephalic and atraumatic. Right Ear: There is impacted cerumen. Left Ear: There is impacted cerumen. Neck:      Thyroid: No thyromegaly. Vascular: No carotid bruit. Cardiovascular:      Rate and Rhythm: Normal rate and regular rhythm. Heart sounds: Normal heart sounds, S1 normal and S2 normal. No murmur heard. Pulmonary:      Effort: Pulmonary effort is normal. No respiratory distress. Breath sounds: Normal breath sounds. No wheezing or rales. Musculoskeletal:      Cervical back: Normal range of motion and neck supple. Neurological:      Mental Status: She is alert and oriented to person, place, and time. Psychiatric:         Behavior: Behavior normal.         ASSESSMENT and PLAN  Diagnoses and all orders for this visit:    1.  Foot swelling  After yellow jacket sting now all resolved  2. Keratitis sicca, bilateral (HCC)-stable    3.  Bilateral impacted cerumen  -     REMOVE IMPACTED EAR WAX    Removed by nurse Hanh Armendariz using irrigation

## 2021-10-11 ENCOUNTER — TELEPHONE (OUTPATIENT)
Dept: INTERNAL MEDICINE CLINIC | Age: 81
End: 2021-10-11

## 2021-10-11 NOTE — TELEPHONE ENCOUNTER
Patient was calling to see if we had any cancellations for a med well exam. She was told to ask nurse if she could get anything sooner than December. I told her I would still ask if possible.

## 2021-10-12 ENCOUNTER — TELEPHONE (OUTPATIENT)
Dept: INTERNAL MEDICINE CLINIC | Age: 81
End: 2021-10-12

## 2021-10-12 NOTE — TELEPHONE ENCOUNTER
Returned call to patient. She went to her dermatologist yesterday who informed her she has the virus that causes warts. She expressed she has several warts all over her body & her dermatologist insisted she have blood work to check on her immune system in general. Advised an appt in-office to discuss further. Patient agreed & scheduled for Oct 14th. Patient was thankful for the appt.

## 2021-10-12 NOTE — TELEPHONE ENCOUNTER
Patient called in to state she saw her dermatologist yesterday afternoon and would like a call back from the nurse to discuss this. PSR tried to get more information for nurse but patient did not want to share any more info. Please call back and advise.

## 2021-10-14 ENCOUNTER — OFFICE VISIT (OUTPATIENT)
Dept: INTERNAL MEDICINE CLINIC | Age: 81
End: 2021-10-14
Payer: MEDICARE

## 2021-10-14 VITALS
WEIGHT: 106 LBS | BODY MASS INDEX: 20.01 KG/M2 | SYSTOLIC BLOOD PRESSURE: 106 MMHG | TEMPERATURE: 98 F | OXYGEN SATURATION: 95 % | HEIGHT: 61 IN | HEART RATE: 68 BPM | DIASTOLIC BLOOD PRESSURE: 69 MMHG | RESPIRATION RATE: 18 BRPM

## 2021-10-14 DIAGNOSIS — T63.441A ALLERGIC REACTION TO BEE STING: Primary | ICD-10-CM

## 2021-10-14 DIAGNOSIS — E03.9 ACQUIRED HYPOTHYROIDISM: ICD-10-CM

## 2021-10-14 DIAGNOSIS — B07.9 VIRAL WARTS, UNSPECIFIED TYPE: ICD-10-CM

## 2021-10-14 LAB
ALBUMIN SERPL-MCNC: 3.3 G/DL (ref 3.5–5)
ALBUMIN/GLOB SERPL: 1 {RATIO} (ref 1.1–2.2)
ALP SERPL-CCNC: 67 U/L (ref 45–117)
ALT SERPL-CCNC: 27 U/L (ref 12–78)
ANION GAP SERPL CALC-SCNC: 3 MMOL/L (ref 5–15)
AST SERPL-CCNC: 24 U/L (ref 15–37)
BASOPHILS # BLD: 0.1 K/UL (ref 0–0.1)
BASOPHILS NFR BLD: 1 % (ref 0–1)
BILIRUB SERPL-MCNC: 0.5 MG/DL (ref 0.2–1)
BUN SERPL-MCNC: 23 MG/DL (ref 6–20)
BUN/CREAT SERPL: 27 (ref 12–20)
CALCIUM SERPL-MCNC: 9 MG/DL (ref 8.5–10.1)
CHLORIDE SERPL-SCNC: 110 MMOL/L (ref 97–108)
CO2 SERPL-SCNC: 26 MMOL/L (ref 21–32)
CREAT SERPL-MCNC: 0.85 MG/DL (ref 0.55–1.02)
DIFFERENTIAL METHOD BLD: NORMAL
EOSINOPHIL # BLD: 0.3 K/UL (ref 0–0.4)
EOSINOPHIL NFR BLD: 3 % (ref 0–7)
ERYTHROCYTE [DISTWIDTH] IN BLOOD BY AUTOMATED COUNT: 12.5 % (ref 11.5–14.5)
GLOBULIN SER CALC-MCNC: 3.4 G/DL (ref 2–4)
GLUCOSE SERPL-MCNC: 87 MG/DL (ref 65–100)
HCT VFR BLD AUTO: 40.3 % (ref 35–47)
HGB BLD-MCNC: 13.3 G/DL (ref 11.5–16)
IMM GRANULOCYTES # BLD AUTO: 0 K/UL (ref 0–0.04)
IMM GRANULOCYTES NFR BLD AUTO: 0 % (ref 0–0.5)
LYMPHOCYTES # BLD: 2.5 K/UL (ref 0.8–3.5)
LYMPHOCYTES NFR BLD: 25 % (ref 12–49)
MCH RBC QN AUTO: 30.4 PG (ref 26–34)
MCHC RBC AUTO-ENTMCNC: 33 G/DL (ref 30–36.5)
MCV RBC AUTO: 92 FL (ref 80–99)
MONOCYTES # BLD: 0.6 K/UL (ref 0–1)
MONOCYTES NFR BLD: 6 % (ref 5–13)
NEUTS SEG # BLD: 6.4 K/UL (ref 1.8–8)
NEUTS SEG NFR BLD: 65 % (ref 32–75)
NRBC # BLD: 0 K/UL (ref 0–0.01)
NRBC BLD-RTO: 0 PER 100 WBC
PLATELET # BLD AUTO: 273 K/UL (ref 150–400)
PMV BLD AUTO: 11.7 FL (ref 8.9–12.9)
POTASSIUM SERPL-SCNC: 4.5 MMOL/L (ref 3.5–5.1)
PROT SERPL-MCNC: 6.7 G/DL (ref 6.4–8.2)
RBC # BLD AUTO: 4.38 M/UL (ref 3.8–5.2)
SODIUM SERPL-SCNC: 139 MMOL/L (ref 136–145)
T4 FREE SERPL-MCNC: 1.1 NG/DL (ref 0.8–1.5)
TSH SERPL DL<=0.05 MIU/L-ACNC: 0.48 UIU/ML (ref 0.36–3.74)
WBC # BLD AUTO: 10 K/UL (ref 3.6–11)

## 2021-10-14 PROCEDURE — 1101F PT FALLS ASSESS-DOCD LE1/YR: CPT | Performed by: INTERNAL MEDICINE

## 2021-10-14 PROCEDURE — G0463 HOSPITAL OUTPT CLINIC VISIT: HCPCS | Performed by: INTERNAL MEDICINE

## 2021-10-14 PROCEDURE — G8536 NO DOC ELDER MAL SCRN: HCPCS | Performed by: INTERNAL MEDICINE

## 2021-10-14 PROCEDURE — 1090F PRES/ABSN URINE INCON ASSESS: CPT | Performed by: INTERNAL MEDICINE

## 2021-10-14 PROCEDURE — G8420 CALC BMI NORM PARAMETERS: HCPCS | Performed by: INTERNAL MEDICINE

## 2021-10-14 PROCEDURE — G8427 DOCREV CUR MEDS BY ELIG CLIN: HCPCS | Performed by: INTERNAL MEDICINE

## 2021-10-14 PROCEDURE — 99213 OFFICE O/P EST LOW 20 MIN: CPT | Performed by: INTERNAL MEDICINE

## 2021-10-14 PROCEDURE — G8510 SCR DEP NEG, NO PLAN REQD: HCPCS | Performed by: INTERNAL MEDICINE

## 2021-10-14 NOTE — PROGRESS NOTES
HISTORY OF PRESENT ILLNESS  Fran Beth is a 80 y.o. female. HPI  Seen because she is worried about her immune system. She notes for several months now she has had more reactive skin to bites, including stings and bug bites. She is using Clobetasol from dermatology. She has not had anaphylaxis or pulmonary involvement or mucous membrane involvement. Recent diagnosis of viral warts on her hands, being treated by dermatology. She wonders if her immune system is suppressed. Weight is stable. No fevers, chills or sweats. No evidence of warts on other body parts. Review of Systems   Constitutional: Negative. Negative for chills, diaphoresis, fever, malaise/fatigue and weight loss. HENT: Negative for congestion, ear discharge, ear pain, nosebleeds and sore throat. Eyes: Negative for pain and discharge. Respiratory: Negative for cough, hemoptysis, sputum production, shortness of breath and wheezing. Cardiovascular: Negative for chest pain. Skin: Positive for itching. Neurological: Negative for headaches. Physical Exam  Vitals and nursing note reviewed. Constitutional:       Appearance: She is well-developed. HENT:      Head: Normocephalic and atraumatic. Neck:      Thyroid: No thyromegaly. Vascular: No carotid bruit. Cardiovascular:      Rate and Rhythm: Normal rate and regular rhythm. Heart sounds: Normal heart sounds, S1 normal and S2 normal. No murmur heard. Pulmonary:      Effort: Pulmonary effort is normal. No respiratory distress. Breath sounds: Normal breath sounds. No wheezing or rales. Musculoskeletal:      Cervical back: Normal range of motion and neck supple. Skin:     Findings: No rash. Comments: Blister on hand from recent derm visit and freezing of wart on hand   Neurological:      Mental Status: She is alert and oriented to person, place, and time.    Psychiatric:         Behavior: Behavior normal.         ASSESSMENT and PLAN  Diagnoses and all orders for this visit:    1. Allergic reaction to bee sting-and to other bugs not sure which ones  She wonders why her body is more reactive-I am not sure and will refer to allergist  No sxs of anaphylaxis  No  Need for epi pen  -     REFERRAL TO ALLERGY  -     CBC WITH AUTOMATED DIFF; Future    2. Viral warts, unspecified type  -     CBC WITH AUTOMATED DIFF; Future  -     METABOLIC PANEL, COMPREHENSIVE; Future    3. Acquired hypothyroidism  -     TSH 3RD GENERATION; Future  -     T4, FREE;  Future

## 2021-10-17 ENCOUNTER — PATIENT MESSAGE (OUTPATIENT)
Dept: INTERNAL MEDICINE CLINIC | Age: 81
End: 2021-10-17

## 2021-10-19 NOTE — TELEPHONE ENCOUNTER
From: Dustin Monroe MD  To: Jose Daniel Castaneda  Sent: 10/17/2021 7:03 PM EDT  Subject: labs    Good news-no red flags on these labs in terms of your immune system. In particular the full cbc with differential is completely normal. I do recommend seeing and allergist/immunologist as we discussed as they will certainly have testing thoughts which are different than what I can do. Take care.   Dustin Monroe MD

## 2021-11-08 ENCOUNTER — TELEPHONE (OUTPATIENT)
Dept: INTERNAL MEDICINE CLINIC | Age: 81
End: 2021-11-08

## 2021-11-08 NOTE — TELEPHONE ENCOUNTER
----- Message from Lc Venegas sent at 11/8/2021 10:29 AM EST -----  Subject: Message to Provider    QUESTIONS  Information for Provider? pt would like a call from the nurse about a   problem she's having   ---------------------------------------------------------------------------  --------------  CALL BACK INFO  What is the best way for the office to contact you? OK to leave message on   voicemail  Preferred Call Back Phone Number? [phone]  ---------------------------------------------------------------------------  --------------  SCRIPT ANSWERS  Relationship to Patient?  Self

## 2021-11-08 NOTE — TELEPHONE ENCOUNTER
Patient reports left sided hip pain x several weeks. She is weight bearing & able to walk but the pain does seem to be getting worse. She understands she may be referred to a specialist but would like to have an appt with PCP to evaluate & discuss. Patient provided an appt [facility] Nov 11th at 11:45 for eval. Patient had no further questions or concerns & was thankful for the appt.

## 2021-11-11 ENCOUNTER — OFFICE VISIT (OUTPATIENT)
Dept: INTERNAL MEDICINE CLINIC | Age: 81
End: 2021-11-11
Payer: MEDICARE

## 2021-11-11 VITALS
SYSTOLIC BLOOD PRESSURE: 116 MMHG | OXYGEN SATURATION: 97 % | RESPIRATION RATE: 16 BRPM | HEART RATE: 67 BPM | DIASTOLIC BLOOD PRESSURE: 77 MMHG | WEIGHT: 105.6 LBS | HEIGHT: 61 IN | TEMPERATURE: 98.1 F | BODY MASS INDEX: 19.94 KG/M2

## 2021-11-11 DIAGNOSIS — M25.552 LEFT HIP PAIN: Primary | ICD-10-CM

## 2021-11-11 PROCEDURE — G8510 SCR DEP NEG, NO PLAN REQD: HCPCS | Performed by: INTERNAL MEDICINE

## 2021-11-11 PROCEDURE — G8536 NO DOC ELDER MAL SCRN: HCPCS | Performed by: INTERNAL MEDICINE

## 2021-11-11 PROCEDURE — G8427 DOCREV CUR MEDS BY ELIG CLIN: HCPCS | Performed by: INTERNAL MEDICINE

## 2021-11-11 PROCEDURE — 1101F PT FALLS ASSESS-DOCD LE1/YR: CPT | Performed by: INTERNAL MEDICINE

## 2021-11-11 PROCEDURE — 99213 OFFICE O/P EST LOW 20 MIN: CPT | Performed by: INTERNAL MEDICINE

## 2021-11-11 PROCEDURE — 1090F PRES/ABSN URINE INCON ASSESS: CPT | Performed by: INTERNAL MEDICINE

## 2021-11-11 PROCEDURE — G8420 CALC BMI NORM PARAMETERS: HCPCS | Performed by: INTERNAL MEDICINE

## 2021-11-11 PROCEDURE — G0463 HOSPITAL OUTPT CLINIC VISIT: HCPCS | Performed by: INTERNAL MEDICINE

## 2021-11-11 NOTE — PROGRESS NOTES
HISTORY OF PRESENT ILLNESS  Ann Bob is a 80 y.o. female. HPI  Seen with a little over three weeks of discomfort in her left upper hip/low back region. Does not wake her from sleep. Has not needed medications. Does not keep her from activities. She played tennis earlier today and had no pain. No weakness or numbness in foot. No injuries. Currently rates pain as 0/10. Questions if her immune system got revved up by the COVID Moderna vaccine. She has had three and feels that her skin was more reactive after the vaccines. It has calmed back down, but she is thinking of seeing an allergist in the spring. Review of Systems   Constitutional: Negative for chills, fever and malaise/fatigue. Gastrointestinal: Negative for abdominal pain. Musculoskeletal: Positive for back pain. Negative for falls. Neurological: Negative for sensory change and focal weakness. Physical Exam  Vitals and nursing note reviewed. Constitutional:       Appearance: She is well-developed. HENT:      Head: Normocephalic and atraumatic. Neck:      Thyroid: No thyromegaly. Vascular: No carotid bruit. Cardiovascular:      Rate and Rhythm: Normal rate and regular rhythm. Heart sounds: Normal heart sounds, S1 normal and S2 normal. No murmur heard. Pulmonary:      Effort: Pulmonary effort is normal. No respiratory distress. Breath sounds: Normal breath sounds. No wheezing or rales. Abdominal:      Tenderness: There is no right CVA tenderness or left CVA tenderness. Musculoskeletal:         General: Normal range of motion. Cervical back: Normal range of motion and neck supple. Comments: Excellent rom left hip   slr is negative  Able to flex and  Extend back no pain  Tests for it band and piriformis muscles are  Normal  Strength intact   Neurological:      Mental Status: She is alert and oriented to person, place, and time.    Psychiatric:         Behavior: Behavior normal. ASSESSMENT and PLAN  Diagnoses and all orders for this visit:    1.  Left hip pain    Reassured exam cw msk and no sign of renal or disc disease  Heating pad  Cont  Tennis and exercises

## 2021-12-05 DIAGNOSIS — E03.9 ACQUIRED HYPOTHYROIDISM: ICD-10-CM

## 2021-12-05 RX ORDER — LEVOTHYROXINE SODIUM 75 UG/1
TABLET ORAL
Qty: 90 TABLET | Refills: 3 | Status: SHIPPED | OUTPATIENT
Start: 2021-12-05 | End: 2022-10-17

## 2021-12-21 ENCOUNTER — OFFICE VISIT (OUTPATIENT)
Dept: INTERNAL MEDICINE CLINIC | Age: 81
End: 2021-12-21
Payer: MEDICARE

## 2021-12-21 VITALS
OXYGEN SATURATION: 99 % | RESPIRATION RATE: 18 BRPM | BODY MASS INDEX: 19.75 KG/M2 | WEIGHT: 104.6 LBS | HEART RATE: 60 BPM | SYSTOLIC BLOOD PRESSURE: 110 MMHG | HEIGHT: 61 IN | DIASTOLIC BLOOD PRESSURE: 62 MMHG | TEMPERATURE: 97.7 F

## 2021-12-21 DIAGNOSIS — N39.0 RECURRENT UTI: ICD-10-CM

## 2021-12-21 DIAGNOSIS — E78.5 DYSLIPIDEMIA: ICD-10-CM

## 2021-12-21 DIAGNOSIS — E03.9 ACQUIRED HYPOTHYROIDISM: ICD-10-CM

## 2021-12-21 DIAGNOSIS — M25.552 LEFT HIP PAIN: ICD-10-CM

## 2021-12-21 DIAGNOSIS — Z00.00 MEDICARE ANNUAL WELLNESS VISIT, SUBSEQUENT: Primary | ICD-10-CM

## 2021-12-21 LAB
ALBUMIN SERPL-MCNC: 3.7 G/DL (ref 3.5–5)
ALBUMIN/GLOB SERPL: 1.3 {RATIO} (ref 1.1–2.2)
ALP SERPL-CCNC: 66 U/L (ref 45–117)
ALT SERPL-CCNC: 28 U/L (ref 12–78)
ANION GAP SERPL CALC-SCNC: 5 MMOL/L (ref 5–15)
AST SERPL-CCNC: 27 U/L (ref 15–37)
BILIRUB SERPL-MCNC: 0.5 MG/DL (ref 0.2–1)
BUN SERPL-MCNC: 17 MG/DL (ref 6–20)
BUN/CREAT SERPL: 24 (ref 12–20)
CALCIUM SERPL-MCNC: 8.9 MG/DL (ref 8.5–10.1)
CHLORIDE SERPL-SCNC: 111 MMOL/L (ref 97–108)
CHOLEST SERPL-MCNC: 170 MG/DL
CO2 SERPL-SCNC: 26 MMOL/L (ref 21–32)
CREAT SERPL-MCNC: 0.7 MG/DL (ref 0.55–1.02)
GLOBULIN SER CALC-MCNC: 2.8 G/DL (ref 2–4)
GLUCOSE SERPL-MCNC: 84 MG/DL (ref 65–100)
HCV AB SERPL QL IA: NONREACTIVE
HDLC SERPL-MCNC: 60 MG/DL
HDLC SERPL: 2.8 {RATIO} (ref 0–5)
LDLC SERPL CALC-MCNC: 96.8 MG/DL (ref 0–100)
POTASSIUM SERPL-SCNC: 4.3 MMOL/L (ref 3.5–5.1)
PROT SERPL-MCNC: 6.5 G/DL (ref 6.4–8.2)
SODIUM SERPL-SCNC: 142 MMOL/L (ref 136–145)
TRIGL SERPL-MCNC: 66 MG/DL (ref ?–150)
VLDLC SERPL CALC-MCNC: 13.2 MG/DL

## 2021-12-21 PROCEDURE — G8420 CALC BMI NORM PARAMETERS: HCPCS | Performed by: INTERNAL MEDICINE

## 2021-12-21 PROCEDURE — 1090F PRES/ABSN URINE INCON ASSESS: CPT | Performed by: INTERNAL MEDICINE

## 2021-12-21 PROCEDURE — G8427 DOCREV CUR MEDS BY ELIG CLIN: HCPCS | Performed by: INTERNAL MEDICINE

## 2021-12-21 PROCEDURE — 99214 OFFICE O/P EST MOD 30 MIN: CPT | Performed by: INTERNAL MEDICINE

## 2021-12-21 PROCEDURE — G8510 SCR DEP NEG, NO PLAN REQD: HCPCS | Performed by: INTERNAL MEDICINE

## 2021-12-21 PROCEDURE — 1101F PT FALLS ASSESS-DOCD LE1/YR: CPT | Performed by: INTERNAL MEDICINE

## 2021-12-21 PROCEDURE — G8536 NO DOC ELDER MAL SCRN: HCPCS | Performed by: INTERNAL MEDICINE

## 2021-12-21 PROCEDURE — G0463 HOSPITAL OUTPT CLINIC VISIT: HCPCS | Performed by: INTERNAL MEDICINE

## 2021-12-21 PROCEDURE — G0439 PPPS, SUBSEQ VISIT: HCPCS | Performed by: INTERNAL MEDICINE

## 2021-12-21 RX ORDER — ESTRADIOL 0.1 MG/G
CREAM VAGINAL
Qty: 42.5 G | Refills: 4 | Status: SHIPPED | OUTPATIENT
Start: 2021-12-21 | End: 2021-12-23 | Stop reason: SDUPTHER

## 2021-12-21 NOTE — PROGRESS NOTES
Rm    Chief Complaint   Patient presents with   Irma Perryos Annual Wellness Visit        Visit Vitals  /76 (BP 1 Location: Left upper arm, BP Patient Position: Sitting, BP Cuff Size: Adult)   Pulse 60   Temp 97.7 °F (36.5 °C) (Oral)   Resp 18   Ht 5' 1\" (1.549 m)   Wt 104 lb 9.6 oz (47.4 kg)   SpO2 99%   BMI 19.76 kg/m²        1. Have you been to the ER, urgent care clinic since your last visit? Hospitalized since your last visit? No    2. Have you seen or consulted any other health care providers outside of the [address] [place] [address] since your last visit? Include any pap smears or colon screening. No     Health Maintenance Due   Topic Date Due    Hepatitis C Screening  Never done    Medicare Yearly Exam  10/20/2021        3 most recent PHQ Screens 12/21/2021   Little interest or pleasure in doing things Not at all   Feeling down, depressed, irritable, or hopeless Not at all   Total Score PHQ 2 0        Fall Risk Assessment, last 12 mths 12/21/2021   Able to walk? Yes   Fall in past 12 months? 0   Do you feel unsteady?  0   Are you worried about falling 0       Learning Assessment 7/2/2014   PRIMARY LEARNER Patient   PRIMARY LANGUAGE ENGLISH   LEARNER PREFERENCE PRIMARY DEMONSTRATION   ANSWERED BY patient   RELATIONSHIP SELF

## 2021-12-21 NOTE — PROGRESS NOTES
HISTORY OF PRESENT ILLNESS  Natasha Branch is a 80 y.o. female. [facility]  Comes in for wellness review. Issues:  1. Warts on fingers. Still working with dermatology. Seeing gradual improvement. 2. History of recurrent UTIs, none in the last year, improved since starting Estrace cream and cranberry. Has seen Dr. Kavita Fragoso in the past.  3. Thyroid, on Levoxyl 75 mcg. Levels were good in October. No symptoms of hypo or hyperthyroidism. 4. Preventive care. Bone density in October of 2020, will be due next year. Mammogram done in August, 2021. Up to date on all shots. Exercises regularly, at least five days a week, and enjoys skiing. No alcohol. Major stressor is caring for her daughter. Review of Systems   Constitutional: Positive for malaise/fatigue. Negative for chills, fever and weight loss. HENT: Negative for hearing loss. Eyes: Negative for blurred vision. Respiratory: Negative for cough, shortness of breath and wheezing. Cardiovascular: Negative for chest pain, palpitations, orthopnea, leg swelling and PND. Gastrointestinal: Negative for abdominal pain, constipation, diarrhea, heartburn and nausea. Genitourinary: Negative for dysuria. Musculoskeletal: Negative for back pain, falls, joint pain and myalgias. Neurological: Negative for dizziness and headaches. Psychiatric/Behavioral: Negative for depression and memory loss. Physical Exam  Vitals and nursing note reviewed. Constitutional:       Appearance: She is well-developed. HENT:      Head: Normocephalic and atraumatic. Right Ear: Tympanic membrane normal.      Left Ear: Tympanic membrane normal.   Eyes:      Extraocular Movements: Extraocular movements intact. Pupils: Pupils are equal, round, and reactive to light. Neck:      Thyroid: No thyromegaly. Vascular: No carotid bruit. Cardiovascular:      Rate and Rhythm: Normal rate and regular rhythm.       Heart sounds: Normal heart sounds, S1 normal and S2 normal. No murmur heard. Pulmonary:      Effort: Pulmonary effort is normal. No respiratory distress. Breath sounds: Normal breath sounds. No wheezing or rales. Abdominal:      General: There is no distension. Palpations: Abdomen is soft. There is no mass. Tenderness: There is no abdominal tenderness. Genitourinary:     Comments: Breast exam bilaterally without masses axillary nodes or discharge. BSE reviewed     Musculoskeletal:      Cervical back: Normal range of motion and neck supple. Right lower leg: No edema. Left lower leg: No edema. Lymphadenopathy:      Cervical: No cervical adenopathy. Skin:     Findings: No rash. Neurological:      General: No focal deficit present. Mental Status: She is alert and oriented to person, place, and time. Psychiatric:         Mood and Affect: Mood normal.         Behavior: Behavior normal.         ASSESSMENT and PLAN  Diagnoses and all orders for this visit:    1. Medicare annual wellness visit, subsequent    2. Acquired hypothyroidism-levels good in oct and no sxs  Cont current dose    3. Left hip pain-resolved cont exercise    4. Recurrent UTI-no recurrence in last year cont the cream and cranberry   -     estradioL (ESTRACE) 0.01 % (0.1 mg/gram) vaginal cream; Administer intravaginally and externally around urethra once a day for a week then administer twice weekly    5. Dyslipidemia-mild in past  -     METABOLIC PANEL, COMPREHENSIVE; Future  -     LIPID PANEL; Future  -     HEPATITIS C AB; Future    This is the Subsequent Medicare Annual Wellness Exam, performed 12 months or more after the Initial AWV or the last Subsequent AWV    I have reviewed the patient's medical history in detail and updated the computerized patient record.        Assessment/Plan   Education and counseling provided:  Are appropriate based on today's review and evaluation  Pneumococcal Vaccine  Influenza Vaccine  Screening Mammography  Bone mass measurement (DEXA)  Screening for glaucoma    1. Medicare annual wellness visit, subsequent  2. Acquired hypothyroidism  3. Left hip pain  4. Recurrent UTI  -     estradioL (ESTRACE) 0.01 % (0.1 mg/gram) vaginal cream; Administer intravaginally and externally around urethra once a day for a week then administer twice weekly, Normal, Disp-42.5 g, R-4  5. Dyslipidemia  -     METABOLIC PANEL, COMPREHENSIVE; Future  -     LIPID PANEL; Future  -     HEPATITIS C AB; Future       Depression Risk Factor Screening     3 most recent PHQ Screens 12/21/2021   Little interest or pleasure in doing things Not at all   Feeling down, depressed, irritable, or hopeless Not at all   Total Score PHQ 2 0       Alcohol Risk Screen   Do you average more than 1 drink per night or more than 7 drinks a week?: (P) No  On any one occasion in the past three months have you had more than 3 drinks containing alcohol?: (P) No          Functional Ability and Level of Safety   Hearing:  Hearing: (P) Patient reports hearing is good      Activities of Daily Living: The home contains: (P) no safety equipment  Functional ADLs: (P) Patient does total self care     Ambulation:  Patient ambulates: (P) with no difficulty     Fall Risk:  Fall Risk Assessment, last 12 mths 12/21/2021   Able to walk? Yes   Fall in past 12 months? 0   Do you feel unsteady?  0   Are you worried about falling 0     Abuse Screen:  Do you ever feel afraid of your partner?: (P) No  Are you in a relationship with someone who physically or mentally threatens you?: (P) No  Is it safe for you to go home?: (P) Yes        Cognitive Screening   Has your family/caregiver stated any concerns about your memory?: (P) No     Cognitive Screening: Normal - Verbal Fluency Test    Health Maintenance Due     Health Maintenance Due   Topic Date Due    Hepatitis C Screening  Never done    Medicare Yearly Exam  10/20/2021       Patient Care Team   Patient Care Team:  Andrew Schneider MD as PCP - General  Terrie Ledezma MD as PCP - [facility] Empaneled Provider    History     Patient Active Problem List   Diagnosis Code    Fibrocystic breast N60.19    Keratitis sicca, bilateral ([address] M35.01    Cancer of skin, squamous cell C44.92    Hypothyroidism E03.9    Osteoporosis M81.0    Advanced care planning/counseling discussion Z71.89    S/P partial hysterectomy Z90.711    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    Cataract H26.9    Constipation K59.00    Cortical senile cataract H25.019    Drusen of macula H35.369    Family history of breast cancer Z80.3    Myopia H52.10    Stress incontinence N39.3    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    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     Past Medical History:   Diagnosis Date    Fibrocystic breast 3/21/2011    Hypothyroid 3/10/2009    Keratitis sicca, bilateral ([facility] [address] 3/21/2011    Skin cancer     Squamous cell carcinoma of lower leg 12/18/12    treated with Mohs surgery    Sun-damaged skin     Sunburn, blistering       Past Surgical History:   Procedure Laterality Date    AMB POC MOHS 1 STAGE T/A/L  12/18/12    squamous cell carcinoma-right lower lateral leg    ENDOSCOPY, COLON, DIAGNOSTIC  2/2/10    1/10 Dr Anthony Perrin 7 year follow up    HX BREAST BIOPSY Left     >15YRS    HX GYN      d and c    HX HEENT  4/13    right leg basal cell resected    HX HYSTERECTOMY  5/26/2015    Evetta Lamp with cystocele repair and being peritoneal cyst repair    HX ORTHOPAEDIC      bunion repair bilaterally    HX ORTHOPAEDIC      left hammartoe repair    HX ORTHOPAEDIC  9/13    left shoulder surgery dr abraham    HX ORTHOPAEDIC  6/17/15    left 4th toe surgery    HX ORTHOPAEDIC  06/30/2021    trigger finger    HX TONSILLECTOMY      FL BREAST SURGERY PROCEDURE UNLISTED      breast cyst     Current Outpatient Medications   Medication Sig Dispense Refill    estradioL (ESTRACE) 0.01 % (0.1 mg/gram) vaginal cream Administer intravaginally and externally around urethra once a day for a week then administer twice weekly 42.5 g 4    levothyroxine (LevoxyL) 75 mcg tablet TAKE 1 TABLET BY MOUTH EVERY DAY BEFORE BREAKFAST 90 Tablet 3    Jinteli 1-5 mg-mcg tab TAKE 1 TABLET BY MOUTH EVERY DAY 84 Tab 1    cranberry extract 450 mg tab tablet Take 450 mg by mouth.  cholecalciferol (VITAMIN D3) 25 mcg (1,000 unit) cap 1,000 Int'l Units.  multivitamin (ONE A DAY) tablet Take 1 Tab by mouth daily.  aspirin 81 mg tablet Take 81 mg by mouth.  DNWHRIAV-OYVNELUTZU-UR GLYCN-C PO take  by mouth.  CALTRATE 600 PO take 600 mg by mouth daily. (Patient not taking: Reported on 12/21/2021)       Allergies   Allergen Reactions    Ciprofloxacin Other (comments)     Ligament issues       Family History   Problem Relation Age of Onset    Heart Failure Mother     Thyroid Disease Mother     Cancer Mother         uterine ca, thyroid ca    Cancer Father         pancreatic ca age 43    Breast Cancer Paternal Aunt      Social History     Tobacco Use    Smoking status: Never Smoker    Smokeless tobacco: Never Used   Substance Use Topics    Alcohol use:  Yes     Alcohol/week: 3.3 standard drinks     Types: 4 Glasses of wine per week     Comment: samaria Youssef MD

## 2021-12-21 NOTE — PATIENT INSTRUCTIONS

## 2021-12-23 ENCOUNTER — TELEPHONE (OUTPATIENT)
Dept: INTERNAL MEDICINE CLINIC | Age: 81
End: 2021-12-23

## 2021-12-23 DIAGNOSIS — N39.0 RECURRENT UTI: ICD-10-CM

## 2021-12-23 RX ORDER — ESTRADIOL 0.1 MG/G
CREAM VAGINAL
Qty: 42.5 G | Refills: 4 | Status: SHIPPED | OUTPATIENT
Start: 2021-12-23 | End: 2022-02-08 | Stop reason: SDUPTHER

## 2021-12-23 NOTE — TELEPHONE ENCOUNTER
Prescription printed. Patient stated she would like it mailed to her. Notified once x is signed it will be placed in the mail. Patient voiced understanding.

## 2021-12-23 NOTE — TELEPHONE ENCOUNTER
----- Message from Sravan sent at 12/23/2021  9:01 AM EST -----  Subject: Message to Provider    QUESTIONS  Information for Provider? Pt is requesting a paper prescription for   estradioL (ESTRACE) 0.01 % sent to her so she can look around for a better   price. ---------------------------------------------------------------------------  --------------  Rik WEN  What is the best way for the office to contact you? OK to leave message on   voicemail  Preferred Call Back Phone Number? [phone]  ---------------------------------------------------------------------------  --------------  SCRIPT ANSWERS  Relationship to Patient?  Self

## 2022-01-04 ENCOUNTER — TELEPHONE (OUTPATIENT)
Dept: INTERNAL MEDICINE CLINIC | Age: 82
End: 2022-01-04

## 2022-01-04 NOTE — TELEPHONE ENCOUNTER
----- Message from Aretha Nielsen sent at 1/4/2022 10:28 AM EST -----  Subject: Message to Provider    QUESTIONS  Information for Provider? [place]. Please   call patient back. Thank you.   ---------------------------------------------------------------------------  --------------  CALL BACK INFO  What is the best way for the office to contact you? OK to leave message on   voicemail  Preferred Call Back Phone Number? [phone]  ---------------------------------------------------------------------------  --------------  SCRIPT ANSWERS  Relationship to Patient?  Self

## 2022-01-04 NOTE — TELEPHONE ENCOUNTER
Patient calling to check the status of the Rx estradiol cream Rx that was placed in the mail around 12/23. Discussed with patient the office mailing process is very slow & could now take up to a month to be received. Patient voiced understanding & will continue to look out for it. She has some cream left from her previous Rx so will try to wait it out but if she runs out she will pickup an Rx from the office.

## 2022-02-08 ENCOUNTER — PATIENT MESSAGE (OUTPATIENT)
Dept: INTERNAL MEDICINE CLINIC | Age: 82
End: 2022-02-08

## 2022-02-08 DIAGNOSIS — N39.0 RECURRENT UTI: ICD-10-CM

## 2022-02-08 RX ORDER — ESTRADIOL 0.1 MG/G
CREAM VAGINAL
Qty: 42.5 G | Refills: 4 | Status: SHIPPED | OUTPATIENT
Start: 2022-02-08

## 2022-02-08 NOTE — TELEPHONE ENCOUNTER
From: Joyce Gray  To: Martha Regan MD  Sent: 2/8/2022 2:50 PM EST  Subject: Prescription    Sorry. I have found best price at [facility]. Can you just call in the prescription there? [facility] pharmacy: [phone]  Thank you.   Elysia Edmond

## 2022-03-19 PROBLEM — Z90.711 S/P PARTIAL HYSTERECTOMY: Status: ACTIVE | Noted: 2019-10-16

## 2022-03-19 PROBLEM — Z71.89 ADVANCED CARE PLANNING/COUNSELING DISCUSSION: Status: ACTIVE | Noted: 2017-10-04

## 2022-07-06 ENCOUNTER — TRANSCRIBE ORDER (OUTPATIENT)
Dept: SCHEDULING | Age: 82
End: 2022-07-06

## 2022-07-06 DIAGNOSIS — Z12.31 BREAST CANCER SCREENING BY MAMMOGRAM: Primary | ICD-10-CM

## 2022-08-15 ENCOUNTER — HOSPITAL ENCOUNTER (OUTPATIENT)
Dept: MAMMOGRAPHY | Age: 82
Discharge: HOME | End: 2022-08-15
Attending: INTERNAL MEDICINE
Payer: MEDICARE

## 2022-08-15 DIAGNOSIS — Z12.31 BREAST CANCER SCREENING BY MAMMOGRAM: ICD-10-CM

## 2022-08-15 PROCEDURE — 77063 BREAST TOMOSYNTHESIS BI: CPT

## 2022-10-17 DIAGNOSIS — E03.9 ACQUIRED HYPOTHYROIDISM: ICD-10-CM

## 2022-10-17 RX ORDER — LEVOTHYROXINE SODIUM 75 UG/1
TABLET ORAL
Qty: 90 TABLET | Refills: 3 | Status: SHIPPED | OUTPATIENT
Start: 2022-10-17

## 2023-01-04 ENCOUNTER — OFFICE VISIT (OUTPATIENT)
Dept: INTERNAL MEDICINE CLINIC | Age: 83
End: 2023-01-04
Payer: MEDICARE

## 2023-01-04 VITALS
OXYGEN SATURATION: 98 % | RESPIRATION RATE: 16 BRPM | TEMPERATURE: 98 F | SYSTOLIC BLOOD PRESSURE: 119 MMHG | HEIGHT: 61 IN | BODY MASS INDEX: 19.75 KG/M2 | HEART RATE: 59 BPM | WEIGHT: 104.6 LBS | DIASTOLIC BLOOD PRESSURE: 73 MMHG

## 2023-01-04 DIAGNOSIS — Z00.00 MEDICARE ANNUAL WELLNESS VISIT, SUBSEQUENT: Primary | ICD-10-CM

## 2023-01-04 DIAGNOSIS — Z78.0 POSTMENOPAUSAL: ICD-10-CM

## 2023-01-04 DIAGNOSIS — E03.9 ACQUIRED HYPOTHYROIDISM: ICD-10-CM

## 2023-01-04 DIAGNOSIS — E78.5 DYSLIPIDEMIA: ICD-10-CM

## 2023-01-04 DIAGNOSIS — Z90.711 S/P PARTIAL HYSTERECTOMY: ICD-10-CM

## 2023-01-04 DIAGNOSIS — M35.01 KERATITIS SICCA, BILATERAL (HCC): ICD-10-CM

## 2023-01-04 DIAGNOSIS — C44.92 CANCER OF SKIN, SQUAMOUS CELL: ICD-10-CM

## 2023-01-04 PROCEDURE — G8427 DOCREV CUR MEDS BY ELIG CLIN: HCPCS | Performed by: INTERNAL MEDICINE

## 2023-01-04 PROCEDURE — 99214 OFFICE O/P EST MOD 30 MIN: CPT | Performed by: INTERNAL MEDICINE

## 2023-01-04 PROCEDURE — G8536 NO DOC ELDER MAL SCRN: HCPCS | Performed by: INTERNAL MEDICINE

## 2023-01-04 PROCEDURE — G0439 PPPS, SUBSEQ VISIT: HCPCS | Performed by: INTERNAL MEDICINE

## 2023-01-04 PROCEDURE — 1090F PRES/ABSN URINE INCON ASSESS: CPT | Performed by: INTERNAL MEDICINE

## 2023-01-04 PROCEDURE — 1101F PT FALLS ASSESS-DOCD LE1/YR: CPT | Performed by: INTERNAL MEDICINE

## 2023-01-04 PROCEDURE — G8510 SCR DEP NEG, NO PLAN REQD: HCPCS | Performed by: INTERNAL MEDICINE

## 2023-01-04 PROCEDURE — G8420 CALC BMI NORM PARAMETERS: HCPCS | Performed by: INTERNAL MEDICINE

## 2023-01-04 PROCEDURE — G0463 HOSPITAL OUTPT CLINIC VISIT: HCPCS | Performed by: INTERNAL MEDICINE

## 2023-01-04 NOTE — PROGRESS NOTES
Jose Daniel Castaneda  Identified pt with two pt identifiers(name and ). Chief Complaint   Patient presents with    Annual Wellness Visit     Pt presenting today for annual [address]; has an (R) eye irritation that started yesterday       1. Have you been to the ER, urgent care clinic since your last visit? Hospitalized since your last visit? NO    2. Have you seen or consulted any other health care providers outside of the [address] [place] [address] since your last visit? Include any pap smears or colon screening. NO      Provider notified of reason for visit, vitals and flowsheets obtained on patients.      Patient received paperwork for advance directive during previous visit but has not completed at this time     Reviewed record In preparation for visit, huddled with provider and have obtained necessary documentation      Health Maintenance Due   Topic    COVID-19 Vaccine (4 - Booster for Moderna series)    Flu Vaccine (1)    Medicare Yearly Exam        Wt Readings from Last 3 Encounters:   23 104 lb 9.6 oz (47.4 kg)   21 104 lb 9.6 oz (47.4 kg)   21 105 lb 9.6 oz (47.9 kg)     Temp Readings from Last 3 Encounters:   23 98 °F (36.7 °C) (Oral)   21 97.7 °F (36.5 °C) (Oral)   21 98.1 °F (36.7 °C) (Oral)     BP Readings from Last 3 Encounters:   23 119/73   21 110/62   21 116/77     Pulse Readings from Last 3 Encounters:   23 (!) 59   21 60   21 67     Vitals:    23 0830   BP: 119/73   Pulse: (!) 59   Resp: 16   Temp: 98 °F (36.7 °C)   TempSrc: Oral   SpO2: 98%   Weight: 104 lb 9.6 oz (47.4 kg)   Height: 5' 1\" (1.549 m)   PainSc:   0 - No pain         Learning Assessment:  :     Learning Assessment 2014   PRIMARY LEARNER Patient   PRIMARY LANGUAGE ENGLISH   LEARNER PREFERENCE PRIMARY DEMONSTRATION   ANSWERED BY patient   RELATIONSHIP SELF       Depression Screening:  :     3 most recent PHQ Screens 2023   Little interest or pleasure in doing things Not at all   Feeling down, depressed, irritable, or hopeless Not at all   Total Score PHQ 2 0   Trouble falling or staying asleep, or sleeping too much Not at all   Feeling tired or having little energy Not at all   Poor appetite, weight loss, or overeating Not at all   Feeling bad about yourself - or that you are a failure or have let yourself or your family down Not at all   Trouble concentrating on things such as school, work, reading, or watching TV Not at all   Moving or speaking so slowly that other people could have noticed; or the opposite being so fidgety that others notice Not at all   Thoughts of being better off dead, or hurting yourself in some way Not at all   PHQ 9 Score 0   How difficult have these problems made it for you to do your work, take care of your home and get along with others Not difficult at all       Fall Risk Assessment:  :     Fall Risk Assessment, last 12 mths 1/4/2023   Able to walk? Yes   Fall in past 12 months? 0   Do you feel unsteady? 0   Are you worried about falling 0       Abuse Screening:  :     Abuse Screening Questionnaire 1/1/2023 12/8/2021 10/14/2021 10/16/2019 10/15/2018 10/4/2017   Do you ever feel afraid of your partner? N N N N N N   Are you in a relationship with someone who physically or mentally threatens you? N N N N N N   Is it safe for you to go home?  Y Y Y Y Y Y       ADL Screening:  :     ADL Assessment 12/21/2021   Feeding yourself No Help Needed   Getting from bed to chair No Help Needed   Getting dressed No Help Needed   Bathing or showering No Help Needed   Walk across the room (includes cane/walker) No Help Needed   Using the telphone No Help Needed   Taking your medications No Help Needed   Preparing meals No Help Needed   Managing money (expenses/bills) No Help Needed   Moderately strenuous housework (laundry) No Help Needed   Shopping for personal items (toiletries/medicines) No Help Needed   Shopping for groceries No Help Needed Driving No Help Needed   Climbing a flight of stairs No Help Needed   Getting to places beyond walking distances No Help Needed         Medication reconciliation up to date and corrected with patient at this time.

## 2023-01-04 NOTE — PATIENT INSTRUCTIONS
Medicare Wellness Visit, Female     The best way to live healthy is to have a lifestyle where you eat a well-balanced diet, exercise regularly, limit alcohol use, and quit all forms of tobacco/nicotine, if applicable. Regular preventive services are another way to keep healthy. Preventive services (vaccines, screening tests, monitoring & exams) can help personalize your care plan, which helps you manage your own care. Screening tests can find health problems at the earliest stages, when they are easiest to treat. [facility] follows the current, evidence-based guidelines published by the [facility] ([facility]) when recommending preventive services for our patients. Because we follow these guidelines, sometimes recommendations change over time as research supports it. (For example, mammograms used to be recommended annually. Even though Medicare will still pay for an annual mammogram, the newer guidelines recommend a mammogram every two years for women of average risk). Of course, you and your doctor may decide to screen more often for some diseases, based on your risk and your co-morbidities (chronic disease you are already diagnosed with). Preventive services for you include:  - Medicare offers their members a free annual wellness visit, which is time for you and your primary care provider to discuss and plan for your preventive service needs.  Take advantage of this benefit every year!    -Over the age of 72 should receive the recommended pneumonia vaccines.    -All adults should have a flu vaccine yearly.  -All adults should have a tetanus vaccine every 10 years.   -Over the age 48 should receive the shingles vaccines.        -All adults should be screened once for Hepatitis C.  -All adults age 38-68 who are overweight should have a diabetes screening test once every three years.   -Other screening tests and preventive services for persons with diabetes include: an eye exam to screen for diabetic retinopathy, a kidney function test, a foot exam, and stricter control over your cholesterol.   -Cardiovascular screening for adults with routine risk involves an electrocardiogram (ECG) at intervals determined by your doctor.     -Colorectal cancer screenings should be done for adults age 39-70 with no increased risk factors for colorectal cancer. There are a number of acceptable methods of screening for this type of cancer. Each test has its own benefits and drawbacks. Discuss with your doctor what is most appropriate for you during your annual wellness visit. The different tests include: colonoscopy (considered the best screening method), a fecal occult blood test, a fecal DNA test, and sigmoidoscopy.    -Lung cancer screening is recommended annually with a low dose CT scan for adults between age 54 and 68, who have smoked at least 30 pack years (equivalent of 1 pack per day for 30 days), and who is a current smoker or quit less than 15 years ago.    -A bone mass density test is recommended when a woman turns 65 to screen for osteoporosis. This test is only recommended one time, as a screening. Some providers will use this same test as a disease monitoring tool if you already have osteoporosis. -Breast cancer screenings are recommended every other year for women of normal risk, age 54-69.    -Cervical cancer screenings for women over age 72 are only recommended with certain risk factors.      Here is a list of your current Health Maintenance items (your personalized list of preventive services) with a due date:  Health Maintenance Due   Topic Date Due    COVID-19 Vaccine (4 - Booster for [employer] series) 10/24/2021    Annual Well Visit  12/22/2022

## 2023-01-04 NOTE — PROGRESS NOTES
HISTORY OF PRESENT ILLNESS  Mat Parish is a [de-identified] y.o. female. HPI  Seen for Medicare wellness visit and follow up on issues:  1. Thyroid, on 75 mcg dose. No symptoms of hypo or hyperthyroidism. Remains very active. Exercises most days. Enjoys skiing. Yamileth Hides is great. 2. Postmenopausal, on Jinteli. Has had a hysterectomy, gets annual mammograms. Stopped topical estrogen and has not had any UTI symptoms in the last six months. 3. History of squamous cell skin cancer, followed by Dr. Wes Pandey. Recently had some areas on her back frozen and will follow up in February. 4. Dry eyes. Sees her ophthalmologist regularly, notes in the last day she has noted some redness of the right lid. No discharge or pain. Preventive Care:  Bone density is due. Up to date on mammogram and flu shot. Has had all COVID boosters, will get us the date of the most recent. Social History:  Cares for her daughter, who is disabled from fibromyalgia. Does not smoke. Social alcohol. Review of Systems   Constitutional:  Negative for chills, fever, malaise/fatigue and weight loss. Eyes:  Negative for blurred vision, photophobia, discharge and redness. Respiratory:  Negative for cough, shortness of breath and wheezing. Cardiovascular:  Negative for chest pain, palpitations, orthopnea, leg swelling and PND. Gastrointestinal:  Negative for abdominal pain, heartburn and nausea. Genitourinary:  Negative for dysuria and hematuria. Musculoskeletal:  Negative for falls and myalgias. Neurological:  Negative for dizziness, sensory change and headaches. Psychiatric/Behavioral:  Negative for depression and memory loss. The patient has insomnia. Physical Exam  Vitals and nursing note reviewed. Constitutional:       Appearance: She is well-developed. HENT:      Head: Normocephalic and atraumatic.       Right Ear: Tympanic membrane normal.      Left Ear: Tympanic membrane normal.   Eyes:      Extraocular Movements: Extraocular movements intact. Conjunctiva/sclera: Conjunctivae normal.      Pupils: Pupils are equal, round, and reactive to light. Comments: Right upper lid mild erythema   Neck:      Thyroid: No thyromegaly. Vascular: No carotid bruit. Cardiovascular:      Rate and Rhythm: Normal rate and regular rhythm. Heart sounds: Normal heart sounds, S1 normal and S2 normal. No murmur heard. Pulmonary:      Effort: Pulmonary effort is normal. No respiratory distress. Breath sounds: Normal breath sounds. No wheezing or rales. Abdominal:      General: There is no distension. Palpations: Abdomen is soft. Tenderness: There is no abdominal tenderness. Genitourinary:     Comments: Breast exam bilaterally without masses axillary nodes or discharge. BSE reviewed     Musculoskeletal:         General: Normal range of motion. Cervical back: Normal range of motion and neck supple. Right lower leg: No edema. Left lower leg: No edema. Neurological:      General: No focal deficit present. Mental Status: She is alert and oriented to person, place, and time. Psychiatric:         Mood and Affect: Mood normal.         Behavior: Behavior normal.       ASSESSMENT and PLAN  Diagnoses and all orders for this visit:    1. Medicare annual wellness visit, subsequent    2. Keratitis sicca, bilateral (HCC)-with some local lid irritation  Warm compresses to right eye and see ophthalmology if not improving  -     CBC WITH AUTOMATED DIFF; Future    3. Acquired hypothyroidism-cont 75 mcg dose  -     TSH 3RD GENERATION; Future  -     T4, FREE; Future    4. Dyslipidemia  -     METABOLIC PANEL, COMPREHENSIVE; Future  -     LIPID PANEL; Future    5. Postmenopausal  -     DEXA BONE DENSITY STUDY AXIAL; Future    6. S/P partial hysterectomy-on hrt and doing well  Cont mammograms    7.  Cancer of skin, squamous cell  This is the Subsequent Medicare Annual Wellness Exam, performed 12 months or more after the Initial AWV or the last Subsequent AWV    I have reviewed the patient's medical history in detail and updated the computerized patient record. Assessment/Plan   Education and counseling provided:  Are appropriate based on today's review and evaluation  Influenza Vaccine  Screening Mammography  Bone mass measurement (DEXA)  Screening for glaucoma    1. Medicare annual wellness visit, subsequent  2. Keratitis sicca, bilateral (HCC)  -     CBC WITH AUTOMATED DIFF; Future  3. Acquired hypothyroidism  -     TSH 3RD GENERATION; Future  -     T4, FREE; Future  4. Dyslipidemia  -     METABOLIC PANEL, COMPREHENSIVE; Future  -     LIPID PANEL; Future  5. Postmenopausal  -     DEXA BONE DENSITY STUDY AXIAL; Future  6. S/P partial hysterectomy  7.  Cancer of skin, squamous cell       Depression Risk Factor Screening     3 most recent PHQ Screens 1/4/2023   Little interest or pleasure in doing things Not at all   Feeling down, depressed, irritable, or hopeless Not at all   Total Score PHQ 2 0   Trouble falling or staying asleep, or sleeping too much Not at all   Feeling tired or having little energy Not at all   Poor appetite, weight loss, or overeating Not at all   Feeling bad about yourself - or that you are a failure or have let yourself or your family down Not at all   Trouble concentrating on things such as school, work, reading, or watching TV Not at all   Moving or speaking so slowly that other people could have noticed; or the opposite being so fidgety that others notice Not at all   Thoughts of being better off dead, or hurting yourself in some way Not at all   PHQ 9 Score 0   How difficult have these problems made it for you to do your work, take care of your home and get along with others Not difficult at all       Alcohol & Drug Abuse Risk Screen   Do you average more than 1 drink per night or more than 7 drinks a week?: (P) No  On any one occasion in the past three months have you had more than 3 drinks containing alcohol?: (P) No          Functional Ability and Level of Safety   Hearing:  Hearing: (P) Patient reports hearing is good    Activities of Daily Living: The home contains: (P) no safety equipment  Functional ADLs: (P) Patient does total self care   Ambulation:  Patient ambulates: (P) with no difficulty   Fall Risk:  Fall Risk Assessment, last 12 mths 1/4/2023   Able to walk? Yes   Fall in past 12 months? 0   Do you feel unsteady?  0   Are you worried about falling 0     Abuse Screen:  Do you ever feel afraid of your partner?: (P) No  Are you in a relationship with someone who physically or mentally threatens you?: (P) No  Is it safe for you to go home?: (P) Yes      Cognitive Screening   Has your family/caregiver stated any concerns about your memory?: (P) No   Cognitive Screening: Normal - Verbal Fluency Test    Health Maintenance Due     Health Maintenance Due   Topic Date Due    COVID-19 Vaccine (4 - Booster for [place] series) 10/24/2021    Medicare Yearly Exam  12/22/2022       Patient Care Team   Patient Care Team:  Pelon Rutherford MD as PCP - Chet Shepard, Maryjean Peabody, MD as PCP - 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    Drusen of macula H35.369    Family history of breast cancer Z80.3    Myopia H52.10    Stress incontinence N39.3    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     Past Medical History:   Diagnosis Date    Fibrocystic breast 3/21/2011    Hypothyroid 3/10/2009    Keratitis sicca, bilateral ([address] 3/21/2011    Skin cancer     Squamous cell carcinoma of lower leg 12/18/12    treated with Mohs surgery    Sun-damaged skin     Sunburn, blistering       Past Surgical History:   Procedure Laterality Date    AMB POC MOHS 1 STAGE T/A/L  12/18/2012    squamous cell carcinoma-right lower lateral leg    ENDOSCOPY, COLON, DIAGNOSTIC  02/02/2010    1/10 Dr Geraldine Do 7 year follow up    HX BREAST BIOPSY Left     >15YRS    HX GYN      d and c    HX HEENT  04/2013    right leg basal cell resected    HX HYSTERECTOMY  05/26/2015    [place] with cystocele repair and being peritoneal cyst repair    HX ORTHOPAEDIC      bunion repair bilaterally    HX ORTHOPAEDIC      left hammartoe repair    HX ORTHOPAEDIC  09/2013    left shoulder surgery dr abraham    HX ORTHOPAEDIC  06/17/2015    left 4th toe surgery    HX ORTHOPAEDIC  06/30/2021    trigger finger    HX TONSILLECTOMY      MD UNLISTED PROCEDURE BREAST      breast cyst     Current Outpatient Medications   Medication Sig Dispense Refill    vit C/E/Zn/coppr/lutein/zeaxan (PRESERVISION AREDS-2 PO) Take  by mouth.      levothyroxine (SYNTHROID) 75 mcg tablet TAKE 1 TABLET BY MOUTH EVERY DAY BEFORE BREAKFAST 90 Tablet 3    Jinteli 1-5 mg-mcg tab TAKE 1 TABLET BY MOUTH EVERY DAY 84 Tab 1    cholecalciferol (VITAMIN D3) 25 mcg (1,000 unit) cap 1,000 Int'l Units. multivitamin (ONE A DAY) tablet Take 1 Tab by mouth daily. ITIDNFNP-CZKFBFBVMP-ED GLYCN-C PO take  by mouth. CALTRATE 600 PO take 600 mg by mouth daily. (Patient not taking: No sig reported)       Allergies   Allergen Reactions    Ciprofloxacin Other (comments)     Ligament issues       Family History   Problem Relation Age of Onset    Heart Failure Mother     Thyroid Disease Mother     Cancer Mother         uterine ca, thyroid ca    Cancer Father         pancreatic    Breast Cancer Paternal Aunt      Social History     Tobacco Use    Smoking status: Never    Smokeless tobacco: Never   Substance Use Topics    Alcohol use:  Yes     Alcohol/week: 2.0 standard drinks Types: 2 Glasses of wine per week     Comment: samaria Valencia MD

## 2023-01-05 ENCOUNTER — TELEPHONE (OUTPATIENT)
Dept: INTERNAL MEDICINE CLINIC | Age: 83
End: 2023-01-05

## 2023-01-05 LAB
ALBUMIN SERPL-MCNC: 3.7 G/DL (ref 3.5–5)
ALBUMIN/GLOB SERPL: 1.3 {RATIO} (ref 1.1–2.2)
ALP SERPL-CCNC: 68 U/L (ref 45–117)
ALT SERPL-CCNC: 24 U/L (ref 12–78)
ANION GAP SERPL CALC-SCNC: 3 MMOL/L (ref 5–15)
AST SERPL-CCNC: 26 U/L (ref 15–37)
BASOPHILS # BLD: 0.1 K/UL (ref 0–0.1)
BASOPHILS NFR BLD: 1 % (ref 0–1)
BILIRUB SERPL-MCNC: 0.7 MG/DL (ref 0.2–1)
BUN SERPL-MCNC: 17 MG/DL (ref 6–20)
BUN/CREAT SERPL: 23 (ref 12–20)
CALCIUM SERPL-MCNC: 8.9 MG/DL (ref 8.5–10.1)
CHLORIDE SERPL-SCNC: 110 MMOL/L (ref 97–108)
CHOLEST SERPL-MCNC: 191 MG/DL
CO2 SERPL-SCNC: 28 MMOL/L (ref 21–32)
CREAT SERPL-MCNC: 0.73 MG/DL (ref 0.55–1.02)
DIFFERENTIAL METHOD BLD: NORMAL
EOSINOPHIL # BLD: 0.2 K/UL (ref 0–0.4)
EOSINOPHIL NFR BLD: 3 % (ref 0–7)
ERYTHROCYTE [DISTWIDTH] IN BLOOD BY AUTOMATED COUNT: 12.6 % (ref 11.5–14.5)
GLOBULIN SER CALC-MCNC: 2.8 G/DL (ref 2–4)
GLUCOSE SERPL-MCNC: 80 MG/DL (ref 65–100)
HCT VFR BLD AUTO: 44 % (ref 35–47)
HDLC SERPL-MCNC: 59 MG/DL
HDLC SERPL: 3.2 {RATIO} (ref 0–5)
HGB BLD-MCNC: 14.3 G/DL (ref 11.5–16)
IMM GRANULOCYTES # BLD AUTO: 0 K/UL (ref 0–0.04)
IMM GRANULOCYTES NFR BLD AUTO: 0 % (ref 0–0.5)
LDLC SERPL CALC-MCNC: 116.4 MG/DL (ref 0–100)
LYMPHOCYTES # BLD: 2.2 K/UL (ref 0.8–3.5)
LYMPHOCYTES NFR BLD: 28 % (ref 12–49)
MCH RBC QN AUTO: 30.2 PG (ref 26–34)
MCHC RBC AUTO-ENTMCNC: 32.5 G/DL (ref 30–36.5)
MCV RBC AUTO: 92.8 FL (ref 80–99)
MONOCYTES # BLD: 0.5 K/UL (ref 0–1)
MONOCYTES NFR BLD: 7 % (ref 5–13)
NEUTS SEG # BLD: 4.8 K/UL (ref 1.8–8)
NEUTS SEG NFR BLD: 61 % (ref 32–75)
NRBC # BLD: 0 K/UL (ref 0–0.01)
NRBC BLD-RTO: 0 PER 100 WBC
PLATELET # BLD AUTO: 240 K/UL (ref 150–400)
PMV BLD AUTO: 12.2 FL (ref 8.9–12.9)
POTASSIUM SERPL-SCNC: 4.3 MMOL/L (ref 3.5–5.1)
PROT SERPL-MCNC: 6.5 G/DL (ref 6.4–8.2)
RBC # BLD AUTO: 4.74 M/UL (ref 3.8–5.2)
SODIUM SERPL-SCNC: 141 MMOL/L (ref 136–145)
T4 FREE SERPL-MCNC: 1.2 NG/DL (ref 0.8–1.5)
TRIGL SERPL-MCNC: 78 MG/DL (ref ?–150)
TSH SERPL DL<=0.05 MIU/L-ACNC: 1.27 UIU/ML (ref 0.36–3.74)
VLDLC SERPL CALC-MCNC: 15.6 MG/DL
WBC # BLD AUTO: 7.9 K/UL (ref 3.6–11)

## 2023-01-05 NOTE — TELEPHONE ENCOUNTER
----- Message from Mildred Barlow sent at 1/4/2023 12:05 PM EST -----  Subject: Message to Provider    QUESTIONS  Information for Provider? Patient called to schedule to her annual   wellness check for January 2024 per her PCP and patient would like Monday   or Wednesday early mornings. She would prefer at 8:30.  ---------------------------------------------------------------------------  --------------  Jenifer WEN  [phone]; OK to leave message on voicemail  ---------------------------------------------------------------------------  --------------  SCRIPT ANSWERS  Relationship to Patient?  Self

## 2023-01-10 DIAGNOSIS — E89.41 HOT FLASHES DUE TO SURGICAL MENOPAUSE: ICD-10-CM

## 2023-01-11 RX ORDER — NORETHINDRONE ACETATE AND ETHINYL ESTRADIOL 1; 5 MG/1; UG/1
TABLET ORAL
Qty: 84 TABLET | Refills: 1 | Status: SHIPPED | OUTPATIENT
Start: 2023-01-11

## 2023-01-19 DIAGNOSIS — E03.9 ACQUIRED HYPOTHYROIDISM: ICD-10-CM

## 2023-01-19 RX ORDER — LEVOTHYROXINE SODIUM 75 UG/1
TABLET ORAL
Qty: 90 TABLET | Refills: 3 | Status: SHIPPED | OUTPATIENT
Start: 2023-01-19

## 2023-02-02 ENCOUNTER — HOSPITAL ENCOUNTER (OUTPATIENT)
Dept: BONE DENSITY | Age: 83
Discharge: HOME | End: 2023-02-02
Attending: INTERNAL MEDICINE
Payer: MEDICARE

## 2023-02-02 DIAGNOSIS — Z78.0 POSTMENOPAUSAL: ICD-10-CM

## 2023-02-02 PROCEDURE — 77080 DXA BONE DENSITY AXIAL: CPT

## 2023-05-04 ENCOUNTER — TELEPHONE (OUTPATIENT)
Dept: INTERNAL MEDICINE CLINIC | Age: 83
End: 2023-05-04

## 2023-08-28 ENCOUNTER — HOSPITAL ENCOUNTER (OUTPATIENT)
Facility: HOSPITAL | Age: 83
Discharge: HOME | End: 2023-08-31
Attending: INTERNAL MEDICINE
Payer: MEDICARE

## 2023-08-28 DIAGNOSIS — Z12.31 VISIT FOR SCREENING MAMMOGRAM: ICD-10-CM

## 2023-08-28 PROCEDURE — 77063 BREAST TOMOSYNTHESIS BI: CPT

## 2023-09-26 ENCOUNTER — TELEPHONE (OUTPATIENT)
Age: 83
End: 2023-09-26

## 2023-09-26 NOTE — TELEPHONE ENCOUNTER
Returned call to patient. Encouraged she start with getting this season's covid vaccine then 2 weeks later get the high dose flu shot. Informed the RSV vaccine is available & okay to get if she chooses. Patient voiced understanding.

## 2023-09-26 NOTE — TELEPHONE ENCOUNTER
----- Message from Aguila Rogers sent at 9/26/2023  2:35 PM EDT -----  Subject: Message to Provider    QUESTIONS  Information for Provider? Patient would like to speak to the nurse about   getting flu, rsv and covid shot. Please call patient.  ---------------------------------------------------------------------------  --------------  Cora Ferrari [facility]  [phone]; OK to leave message on voicemail  ---------------------------------------------------------------------------  --------------  SCRIPT ANSWERS  Relationship to Patient?  Self

## 2024-01-07 SDOH — HEALTH STABILITY: PHYSICAL HEALTH: ON AVERAGE, HOW MANY DAYS PER WEEK DO YOU ENGAGE IN MODERATE TO STRENUOUS EXERCISE (LIKE A BRISK WALK)?: 5 DAYS

## 2024-01-07 SDOH — ECONOMIC STABILITY: TRANSPORTATION INSECURITY
IN THE PAST 12 MONTHS, HAS LACK OF TRANSPORTATION KEPT YOU FROM MEETINGS, WORK, OR FROM GETTING THINGS NEEDED FOR DAILY LIVING?: NO

## 2024-01-07 SDOH — ECONOMIC STABILITY: FOOD INSECURITY: WITHIN THE PAST 12 MONTHS, THE FOOD YOU BOUGHT JUST DIDN'T LAST AND YOU DIDN'T HAVE MONEY TO GET MORE.: NEVER TRUE

## 2024-01-07 SDOH — ECONOMIC STABILITY: INCOME INSECURITY: HOW HARD IS IT FOR YOU TO PAY FOR THE VERY BASICS LIKE FOOD, HOUSING, MEDICAL CARE, AND HEATING?: NOT HARD AT ALL

## 2024-01-07 SDOH — ECONOMIC STABILITY: FOOD INSECURITY: WITHIN THE PAST 12 MONTHS, YOU WORRIED THAT YOUR FOOD WOULD RUN OUT BEFORE YOU GOT MONEY TO BUY MORE.: NEVER TRUE

## 2024-01-07 SDOH — HEALTH STABILITY: PHYSICAL HEALTH: ON AVERAGE, HOW MANY MINUTES DO YOU ENGAGE IN EXERCISE AT THIS LEVEL?: 40 MIN

## 2024-01-07 SDOH — ECONOMIC STABILITY: HOUSING INSECURITY
IN THE LAST 12 MONTHS, WAS THERE A TIME WHEN YOU DID NOT HAVE A STEADY PLACE TO SLEEP OR SLEPT IN A SHELTER (INCLUDING NOW)?: NO

## 2024-01-07 ASSESSMENT — LIFESTYLE VARIABLES
HOW OFTEN DO YOU HAVE SIX OR MORE DRINKS ON ONE OCCASION: 1
HOW MANY STANDARD DRINKS CONTAINING ALCOHOL DO YOU HAVE ON A TYPICAL DAY: 1
HOW OFTEN DO YOU HAVE A DRINK CONTAINING ALCOHOL: 3
HOW OFTEN DO YOU HAVE A DRINK CONTAINING ALCOHOL: 2-4 TIMES A MONTH
HOW MANY STANDARD DRINKS CONTAINING ALCOHOL DO YOU HAVE ON A TYPICAL DAY: 1 OR 2

## 2024-01-07 ASSESSMENT — PATIENT HEALTH QUESTIONNAIRE - PHQ9
SUM OF ALL RESPONSES TO PHQ QUESTIONS 1-9: 0
2. FEELING DOWN, DEPRESSED OR HOPELESS: 0
SUM OF ALL RESPONSES TO PHQ QUESTIONS 1-9: 0
SUM OF ALL RESPONSES TO PHQ9 QUESTIONS 1 & 2: 0
1. LITTLE INTEREST OR PLEASURE IN DOING THINGS: 0

## 2024-01-10 ENCOUNTER — OFFICE VISIT (OUTPATIENT)
Age: 84
End: 2024-01-10
Payer: MEDICARE

## 2024-01-10 VITALS
BODY MASS INDEX: 19.71 KG/M2 | SYSTOLIC BLOOD PRESSURE: 125 MMHG | DIASTOLIC BLOOD PRESSURE: 83 MMHG | RESPIRATION RATE: 16 BRPM | HEIGHT: 61 IN | TEMPERATURE: 97.9 F | WEIGHT: 104.4 LBS

## 2024-01-10 DIAGNOSIS — N39.0 RECURRENT UTI: ICD-10-CM

## 2024-01-10 DIAGNOSIS — E78.5 DYSLIPIDEMIA, GOAL LDL BELOW 100: ICD-10-CM

## 2024-01-10 DIAGNOSIS — Z78.0 POSTMENOPAUSAL: ICD-10-CM

## 2024-01-10 DIAGNOSIS — Z00.00 MEDICARE ANNUAL WELLNESS VISIT, SUBSEQUENT: Primary | ICD-10-CM

## 2024-01-10 DIAGNOSIS — E03.9 ACQUIRED HYPOTHYROIDISM: ICD-10-CM

## 2024-01-10 DIAGNOSIS — M35.01 SJOGREN SYNDROME WITH KERATOCONJUNCTIVITIS (HCC): ICD-10-CM

## 2024-01-10 LAB
ALBUMIN SERPL-MCNC: 3.5 G/DL (ref 3.5–5)
ALBUMIN/GLOB SERPL: 1.1 (ref 1.1–2.2)
ALP SERPL-CCNC: 65 U/L (ref 45–117)
ALT SERPL-CCNC: 24 U/L (ref 12–78)
ANION GAP SERPL CALC-SCNC: 3 MMOL/L (ref 5–15)
AST SERPL-CCNC: 17 U/L (ref 15–37)
BILIRUB SERPL-MCNC: 0.6 MG/DL (ref 0.2–1)
BUN SERPL-MCNC: 19 MG/DL (ref 6–20)
BUN/CREAT SERPL: 20 (ref 12–20)
CALCIUM SERPL-MCNC: 8.7 MG/DL (ref 8.5–10.1)
CHLORIDE SERPL-SCNC: 112 MMOL/L (ref 97–108)
CHOLEST SERPL-MCNC: 188 MG/DL
CO2 SERPL-SCNC: 28 MMOL/L (ref 21–32)
CREAT SERPL-MCNC: 0.93 MG/DL (ref 0.55–1.02)
ERYTHROCYTE [DISTWIDTH] IN BLOOD BY AUTOMATED COUNT: 12.5 % (ref 11.5–14.5)
GLOBULIN SER CALC-MCNC: 3.1 G/DL (ref 2–4)
GLUCOSE SERPL-MCNC: 91 MG/DL (ref 65–100)
HCT VFR BLD AUTO: 42.7 % (ref 35–47)
HDLC SERPL-MCNC: 61 MG/DL
HDLC SERPL: 3.1 (ref 0–5)
HGB BLD-MCNC: 14.2 G/DL (ref 11.5–16)
LDLC SERPL CALC-MCNC: 109.8 MG/DL (ref 0–100)
MCH RBC QN AUTO: 30.4 PG (ref 26–34)
MCHC RBC AUTO-ENTMCNC: 33.3 G/DL (ref 30–36.5)
MCV RBC AUTO: 91.4 FL (ref 80–99)
NRBC # BLD: 0 K/UL (ref 0–0.01)
NRBC BLD-RTO: 0 PER 100 WBC
PLATELET # BLD AUTO: 278 K/UL (ref 150–400)
PMV BLD AUTO: 11.3 FL (ref 8.9–12.9)
POTASSIUM SERPL-SCNC: 4.2 MMOL/L (ref 3.5–5.1)
PROT SERPL-MCNC: 6.6 G/DL (ref 6.4–8.2)
RBC # BLD AUTO: 4.67 M/UL (ref 3.8–5.2)
SODIUM SERPL-SCNC: 143 MMOL/L (ref 136–145)
T4 FREE SERPL-MCNC: 1.3 NG/DL (ref 0.8–1.5)
TRIGL SERPL-MCNC: 86 MG/DL
TSH SERPL DL<=0.05 MIU/L-ACNC: 1.81 UIU/ML (ref 0.36–3.74)
VLDLC SERPL CALC-MCNC: 17.2 MG/DL
WBC # BLD AUTO: 9.3 K/UL (ref 3.6–11)

## 2024-01-10 PROCEDURE — G0439 PPPS, SUBSEQ VISIT: HCPCS | Performed by: INTERNAL MEDICINE

## 2024-01-10 PROCEDURE — 99213 OFFICE O/P EST LOW 20 MIN: CPT | Performed by: INTERNAL MEDICINE

## 2024-01-10 PROCEDURE — G8484 FLU IMMUNIZE NO ADMIN: HCPCS | Performed by: INTERNAL MEDICINE

## 2024-01-10 PROCEDURE — 1123F ACP DISCUSS/DSCN MKR DOCD: CPT | Performed by: INTERNAL MEDICINE

## 2024-01-10 PROCEDURE — G8427 DOCREV CUR MEDS BY ELIG CLIN: HCPCS | Performed by: INTERNAL MEDICINE

## 2024-01-10 PROCEDURE — 1090F PRES/ABSN URINE INCON ASSESS: CPT | Performed by: INTERNAL MEDICINE

## 2024-01-10 PROCEDURE — G8420 CALC BMI NORM PARAMETERS: HCPCS | Performed by: INTERNAL MEDICINE

## 2024-01-10 PROCEDURE — 4004F PT TOBACCO SCREEN RCVD TLK: CPT | Performed by: INTERNAL MEDICINE

## 2024-01-10 PROCEDURE — G8399 PT W/DXA RESULTS DOCUMENT: HCPCS | Performed by: INTERNAL MEDICINE

## 2024-01-10 RX ORDER — ESTRADIOL 0.1 MG/G
2 CREAM VAGINAL
COMMUNITY

## 2024-01-10 NOTE — PROGRESS NOTES
Lidya Tomlin (:  1940) is a 83 y.o. female,Established patient, here for evaluation of the following chief complaint(s):  No chief complaint on file.         ASSESSMENT/PLAN:  1. Medicare annual wellness visit, subsequent  2. Sjogren syndrome with keratoconjunctivitis (HCC)  -     CBC; Future  3. Acquired hypothyroidism-euthyroid on 75 mcg dose   -     TSH; Future  -     T4, Free; Future  4. Postmenopausal-dexa in  mammogram this year  5. Dyslipidemia, goal LDL below 100  -     Lipid Panel; Future  -     Comprehensive Metabolic Panel; Future  6. Recurrent UTI-better with topical estrogen biweekly  Assessment & Plan:         No follow-ups on file.         Subjective   SUBJECTIVE/OBJECTIVE:  HPI  Seen for Medicare wellness visit.  Also has some concerns.    In the last year she had 3 UTIs treated at urgent care went to urology Dr. Gonzales who did an ultrasound which was normal and started her on topical vaginal Estrace cream which has been helpful no recent UTIs since doing this.    Thyroid on 75 mcg dose daily no symptoms of hypo or hyperthyroidism.  Still plays tennis regularly and is in her words the arms and legs for her daughter who is disabled.    Postmenopausal has been on HRT gets annual mammograms bone density is due in .    Mild dyslipidemia with no cardiac issues.    Sjogren's syndrome and with recent tearing of eye will be seeing [facility] for procedure to try to help.    Social history very active plays tennis and walks regularly no tobacco no more than 2 glasses of alcohol a week  Review of Systems       Objective   Physical Exam  Constitutional:       General: She is not in acute distress.     Appearance: Normal appearance.   HENT:      Head: Normocephalic and atraumatic.      Right Ear: Tympanic membrane and ear canal normal.      Left Ear: Tympanic membrane and ear canal normal.   Cardiovascular:      Rate and Rhythm: Normal rate and regular rhythm.   Pulmonary:

## 2024-03-05 NOTE — TELEPHONE ENCOUNTER
----- Message from Brittany Vargas sent at 10/3/2018  1:49 PM EDT -----  Regarding: Dr. Pina Mogadore  Pt is retuning missed call from [facility].  Best contact [phone]
Returned patient call. Identified x2. Will fax lab request to [facility] on [facility] so patient doesn't have to come  the paperwork.     Ed Bower, YashiraD, BCPS, CDE
Detail Level: Detailed

## 2024-03-28 DIAGNOSIS — E03.9 ACQUIRED HYPOTHYROIDISM: Primary | ICD-10-CM

## 2024-03-28 RX ORDER — LEVOTHYROXINE SODIUM 0.07 MG/1
TABLET ORAL
Qty: 90 TABLET | Refills: 3 | Status: SHIPPED | OUTPATIENT
Start: 2024-03-28

## 2024-05-02 DIAGNOSIS — E89.41 HOT FLASHES DUE TO SURGICAL MENOPAUSE: Primary | ICD-10-CM

## 2024-05-02 RX ORDER — NORETHINDRONE ACETATE AND ETHINYL ESTRADIOL 1; 5 MG/1; UG/1
1 TABLET ORAL DAILY
Qty: 84 TABLET | Refills: 1 | Status: SHIPPED | OUTPATIENT
Start: 2024-05-02

## 2024-05-02 NOTE — TELEPHONE ENCOUNTER
Medication refill - phone call disconnected     norethindrone-ethinyl estradiol (JINTELI) 1-5 MG-MCG TABS     [facility]ER    [address], [place] [address] [phone]

## 2024-07-30 ENCOUNTER — TRANSCRIBE ORDERS (OUTPATIENT)
Facility: HOSPITAL | Age: 84
End: 2024-07-30

## 2024-07-30 DIAGNOSIS — Z12.31 SCREENING MAMMOGRAM FOR BREAST CANCER: Primary | ICD-10-CM

## 2024-09-23 ENCOUNTER — HOSPITAL ENCOUNTER (OUTPATIENT)
Facility: HOSPITAL | Age: 84
Discharge: HOME | End: 2024-09-26
Attending: INTERNAL MEDICINE
Payer: MEDICARE

## 2024-09-23 DIAGNOSIS — Z12.31 SCREENING MAMMOGRAM FOR BREAST CANCER: ICD-10-CM

## 2024-09-23 PROCEDURE — 77067 SCR MAMMO BI INCL CAD: CPT

## 2025-01-07 SDOH — HEALTH STABILITY: PHYSICAL HEALTH: ON AVERAGE, HOW MANY MINUTES DO YOU ENGAGE IN EXERCISE AT THIS LEVEL?: 40 MIN

## 2025-01-07 SDOH — HEALTH STABILITY: PHYSICAL HEALTH: ON AVERAGE, HOW MANY DAYS PER WEEK DO YOU ENGAGE IN MODERATE TO STRENUOUS EXERCISE (LIKE A BRISK WALK)?: 4 DAYS

## 2025-01-07 ASSESSMENT — PATIENT HEALTH QUESTIONNAIRE - PHQ9
SUM OF ALL RESPONSES TO PHQ QUESTIONS 1-9: 0
2. FEELING DOWN, DEPRESSED OR HOPELESS: NOT AT ALL
SUM OF ALL RESPONSES TO PHQ QUESTIONS 1-9: 0
SUM OF ALL RESPONSES TO PHQ9 QUESTIONS 1 & 2: 0
1. LITTLE INTEREST OR PLEASURE IN DOING THINGS: NOT AT ALL
SUM OF ALL RESPONSES TO PHQ QUESTIONS 1-9: 0
SUM OF ALL RESPONSES TO PHQ QUESTIONS 1-9: 0

## 2025-01-07 ASSESSMENT — LIFESTYLE VARIABLES
HOW OFTEN DO YOU HAVE A DRINK CONTAINING ALCOHOL: 2-4 TIMES A MONTH
HOW MANY STANDARD DRINKS CONTAINING ALCOHOL DO YOU HAVE ON A TYPICAL DAY: 1
HOW OFTEN DO YOU HAVE A DRINK CONTAINING ALCOHOL: 3
HOW OFTEN DO YOU HAVE SIX OR MORE DRINKS ON ONE OCCASION: 1
HOW MANY STANDARD DRINKS CONTAINING ALCOHOL DO YOU HAVE ON A TYPICAL DAY: 1 OR 2

## 2025-01-10 SDOH — ECONOMIC STABILITY: FOOD INSECURITY: WITHIN THE PAST 12 MONTHS, YOU WORRIED THAT YOUR FOOD WOULD RUN OUT BEFORE YOU GOT MONEY TO BUY MORE.: NEVER TRUE

## 2025-01-10 SDOH — ECONOMIC STABILITY: INCOME INSECURITY: IN THE LAST 12 MONTHS, WAS THERE A TIME WHEN YOU WERE NOT ABLE TO PAY THE MORTGAGE OR RENT ON TIME?: NO

## 2025-01-10 SDOH — ECONOMIC STABILITY: TRANSPORTATION INSECURITY
IN THE PAST 12 MONTHS, HAS THE LACK OF TRANSPORTATION KEPT YOU FROM MEDICAL APPOINTMENTS OR FROM GETTING MEDICATIONS?: NO

## 2025-01-10 SDOH — ECONOMIC STABILITY: FOOD INSECURITY: WITHIN THE PAST 12 MONTHS, THE FOOD YOU BOUGHT JUST DIDN'T LAST AND YOU DIDN'T HAVE MONEY TO GET MORE.: NEVER TRUE

## 2025-01-13 ENCOUNTER — OFFICE VISIT (OUTPATIENT)
Facility: CLINIC | Age: 85
End: 2025-01-13
Payer: MEDICARE

## 2025-01-13 VITALS
DIASTOLIC BLOOD PRESSURE: 62 MMHG | WEIGHT: 104 LBS | TEMPERATURE: 98 F | SYSTOLIC BLOOD PRESSURE: 134 MMHG | RESPIRATION RATE: 16 BRPM | BODY MASS INDEX: 19.63 KG/M2 | HEIGHT: 61 IN | OXYGEN SATURATION: 96 % | HEART RATE: 58 BPM

## 2025-01-13 DIAGNOSIS — Z78.0 POSTMENOPAUSAL: ICD-10-CM

## 2025-01-13 DIAGNOSIS — E89.41 HOT FLASHES DUE TO SURGICAL MENOPAUSE: ICD-10-CM

## 2025-01-13 DIAGNOSIS — E03.9 ACQUIRED HYPOTHYROIDISM: ICD-10-CM

## 2025-01-13 DIAGNOSIS — Z00.00 MEDICARE ANNUAL WELLNESS VISIT, SUBSEQUENT: Primary | ICD-10-CM

## 2025-01-13 DIAGNOSIS — M35.01 SJOGREN SYNDROME WITH KERATOCONJUNCTIVITIS (HCC): ICD-10-CM

## 2025-01-13 DIAGNOSIS — N20.0 NEPHROLITHIASIS: ICD-10-CM

## 2025-01-13 LAB
ALBUMIN SERPL-MCNC: 3.8 G/DL (ref 3.5–5)
ALBUMIN/GLOB SERPL: 1.3 (ref 1.1–2.2)
ALP SERPL-CCNC: 65 U/L (ref 45–117)
ALT SERPL-CCNC: 28 U/L (ref 12–78)
ANION GAP SERPL CALC-SCNC: 5 MMOL/L (ref 2–12)
AST SERPL-CCNC: 24 U/L (ref 15–37)
BILIRUB SERPL-MCNC: 0.6 MG/DL (ref 0.2–1)
BUN SERPL-MCNC: 18 MG/DL (ref 6–20)
BUN/CREAT SERPL: 25 (ref 12–20)
CALCIUM SERPL-MCNC: 9.2 MG/DL (ref 8.5–10.1)
CHLORIDE SERPL-SCNC: 110 MMOL/L (ref 97–108)
CO2 SERPL-SCNC: 27 MMOL/L (ref 21–32)
CREAT SERPL-MCNC: 0.73 MG/DL (ref 0.55–1.02)
ERYTHROCYTE [DISTWIDTH] IN BLOOD BY AUTOMATED COUNT: 12.8 % (ref 11.5–14.5)
GLOBULIN SER CALC-MCNC: 3 G/DL (ref 2–4)
GLUCOSE SERPL-MCNC: 86 MG/DL (ref 65–100)
HCT VFR BLD AUTO: 43.1 % (ref 35–47)
HGB BLD-MCNC: 14.1 G/DL (ref 11.5–16)
MCH RBC QN AUTO: 31 PG (ref 26–34)
MCHC RBC AUTO-ENTMCNC: 32.7 G/DL (ref 30–36.5)
MCV RBC AUTO: 94.7 FL (ref 80–99)
NRBC # BLD: 0 K/UL (ref 0–0.01)
NRBC BLD-RTO: 0 PER 100 WBC
PLATELET # BLD AUTO: 262 K/UL (ref 150–400)
PMV BLD AUTO: 11.7 FL (ref 8.9–12.9)
POTASSIUM SERPL-SCNC: 4.1 MMOL/L (ref 3.5–5.1)
PROT SERPL-MCNC: 6.8 G/DL (ref 6.4–8.2)
RBC # BLD AUTO: 4.55 M/UL (ref 3.8–5.2)
SODIUM SERPL-SCNC: 142 MMOL/L (ref 136–145)
T4 FREE SERPL-MCNC: 1.2 NG/DL (ref 0.8–1.5)
TSH SERPL DL<=0.05 MIU/L-ACNC: 1.9 UIU/ML (ref 0.36–3.74)
WBC # BLD AUTO: 5.8 K/UL (ref 3.6–11)

## 2025-01-13 PROCEDURE — 99213 OFFICE O/P EST LOW 20 MIN: CPT | Performed by: INTERNAL MEDICINE

## 2025-01-13 RX ORDER — ESTRADIOL 0.1 MG/G
1 CREAM VAGINAL
Qty: 42 G | Refills: 5 | Status: SHIPPED | OUTPATIENT
Start: 2025-01-13

## 2025-01-13 RX ORDER — NORETHINDRONE ACETATE AND ETHINYL ESTRADIOL 1; 5 MG/1; UG/1
1 TABLET ORAL
Qty: 84 TABLET | Refills: 1 | Status: SHIPPED | OUTPATIENT
Start: 2025-01-13

## 2025-01-13 RX ORDER — LEVOTHYROXINE SODIUM 75 UG/1
TABLET ORAL
Qty: 90 TABLET | Refills: 3 | Status: SHIPPED | OUTPATIENT
Start: 2025-01-13

## 2025-01-13 NOTE — PROGRESS NOTES
Lidya Tomlin (:  1940) is a 84 y.o. female,Established patient, here for evaluation of the following chief complaint(s):  Medicare AWV (AWV; patient is fasting )         Assessment & Plan  Medicare annual wellness visit, subsequent  Continue mammograms annually.  Due for bone density this is ordered.  Discussed vaccines reports up-to-date on flu and COVID.  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       Subjective   HPI  Since our last visit she had a hospital visit at [facility]. Because of kidney stone pain she is working with [facility] and reportedly has a 3 cm stone that they are following.  She has a follow-up ultrasound.  Currently no flank pain fevers or chills nausea or vomiting.  Mild urinary discomfort.  Some low back discomfort.  Continues to care for her daughter who is chronically disabled and describes herself as the arms and legs and caregiver for her daughter.  Currently using oral

## 2025-01-13 NOTE — ASSESSMENT & PLAN NOTE
Has had recent kidney stones and was hospitalized at [facility]  Some mild discomfort present but no n/v no fevers no hematuria currently  Reports a stone 3cm was seen at [facility]

## 2025-01-13 NOTE — ASSESSMENT & PLAN NOTE
Currently on 6 days a week of levothyroxine no real symptoms of hypo or hyperthyroidism    Orders:    Comprehensive Metabolic Panel; Future    TSH; Future    T4, Free; Future    levothyroxine (SYNTHROID) 75 MCG tablet; 6 days a week

## 2025-01-13 NOTE — PATIENT INSTRUCTIONS
professional. [employer], [facility], disclaims any warranty or liability for your use of this information.    Personalized Preventive Plan for Lidya Tomlin - 1/13/2025  Medicare offers a range of preventive health benefits. Some of the tests and screenings are paid in full while other may be subject to a deductible, co-insurance, and/or copay.  Some of these benefits include a comprehensive review of your medical history including lifestyle, illnesses that may run in your family, and various assessments and screenings as appropriate.  After reviewing your medical record and screening and assessments performed today your provider may have ordered immunizations, labs, imaging, and/or referrals for you.  A list of these orders (if applicable) as well as your Preventive Care list are included within your After Visit Summary for your review.

## 2025-02-03 ENCOUNTER — HOSPITAL ENCOUNTER (OUTPATIENT)
Age: 85
Discharge: HOME | End: 2025-02-06
Payer: MEDICARE

## 2025-02-03 DIAGNOSIS — Z78.0 POSTMENOPAUSAL: ICD-10-CM

## 2025-02-03 PROCEDURE — 77080 DXA BONE DENSITY AXIAL: CPT
